# Patient Record
Sex: FEMALE | HISPANIC OR LATINO | Employment: UNEMPLOYED | URBAN - METROPOLITAN AREA
[De-identification: names, ages, dates, MRNs, and addresses within clinical notes are randomized per-mention and may not be internally consistent; named-entity substitution may affect disease eponyms.]

---

## 2024-04-30 ENCOUNTER — HOSPITAL ENCOUNTER (OUTPATIENT)
Facility: MEDICAL CENTER | Age: 24
End: 2024-04-30
Payer: COMMERCIAL

## 2024-04-30 ENCOUNTER — APPOINTMENT (OUTPATIENT)
Dept: OBGYN | Facility: CLINIC | Age: 24
End: 2024-04-30

## 2024-04-30 ENCOUNTER — INITIAL PRENATAL (OUTPATIENT)
Dept: OBGYN | Facility: CLINIC | Age: 24
End: 2024-04-30

## 2024-04-30 ENCOUNTER — APPOINTMENT (OUTPATIENT)
Dept: RADIOLOGY | Facility: IMAGING CENTER | Age: 24
End: 2024-04-30

## 2024-04-30 VITALS — SYSTOLIC BLOOD PRESSURE: 100 MMHG | WEIGHT: 101 LBS | DIASTOLIC BLOOD PRESSURE: 60 MMHG

## 2024-04-30 DIAGNOSIS — N91.2 AMENORRHEA: ICD-10-CM

## 2024-04-30 DIAGNOSIS — Z34.01 ENCOUNTER FOR SUPERVISION OF NORMAL FIRST PREGNANCY IN FIRST TRIMESTER: ICD-10-CM

## 2024-04-30 PROCEDURE — 76801 OB US < 14 WKS SINGLE FETUS: CPT | Mod: TC | Performed by: NURSE PRACTITIONER

## 2024-04-30 PROCEDURE — 76817 TRANSVAGINAL US OBSTETRIC: CPT | Mod: TC | Performed by: NURSE PRACTITIONER

## 2024-04-30 ASSESSMENT — EDINBURGH POSTNATAL DEPRESSION SCALE (EPDS)
I HAVE BEEN SO UNHAPPY THAT I HAVE HAD DIFFICULTY SLEEPING: NOT AT ALL
I HAVE BEEN ABLE TO LAUGH AND SEE THE FUNNY SIDE OF THINGS: AS MUCH AS I ALWAYS COULD
TOTAL SCORE: 4
I HAVE BLAMED MYSELF UNNECESSARILY WHEN THINGS WENT WRONG: NO, NEVER
THINGS HAVE BEEN GETTING ON TOP OF ME: NO, MOST OF THE TIME I HAVE COPED QUITE WELL
I HAVE BEEN SO UNHAPPY THAT I HAVE BEEN CRYING: NO, NEVER
I HAVE FELT SAD OR MISERABLE: NO, NOT AT ALL
I HAVE BEEN ANXIOUS OR WORRIED FOR NO GOOD REASON: HARDLY EVER
I HAVE LOOKED FORWARD WITH ENJOYMENT TO THINGS: RATHER LESS THAN I USED TO
I HAVE FELT SCARED OR PANICKY FOR NO GOOD REASON: NO, NOT MUCH
THE THOUGHT OF HARMING MYSELF HAS OCCURRED TO ME: NEVER

## 2024-04-30 NOTE — PROGRESS NOTES
Risk factors:   none  Referrals made today:   none      Subjective:   Monserrat Arceo is a 23 y.o.  who presents for her new OB exam. She is 10w0d with an FAY of Estimated Date of Delivery: 24 based off of US. This was unplanned and desired. She feels overall well. Her partner is supportive.     She has no concerns at this time. She lives with her partner and his family, currently staying at home. She is from [facility]. Denies ER visits or previous care in this pregnancy.     Denies any current or hx of sexual, emotional or physical abuse or trauma.     Last pap in [facility] in --WNL.    ROS:  denies weakness, fatigue, or malaise  denies headache, changes in vision  denies constipation, diarrhea  denies dysuria  denies changes in appetite, nausea, vomiting  denies vaginal bleeding, leaking of fluid, discharge, irritation  denies depression, anxiety  denies cramping/contractions  denies fetal movement    Not on File     History reviewed. No pertinent past medical history.  History of Varicella: denies  History of HSV I or II in self or partner: denies  History of Thyroid problems: denies    History reviewed. No pertinent family history.  denies hx family genetic conditions    Social History     Socioeconomic History    Marital status:      Spouse name: Not on file    Number of children: Not on file    Years of education: Not on file    Highest education level: Not on file   Occupational History    Not on file   Tobacco Use    Smoking status: Never    Smokeless tobacco: Never   Vaping Use    Vaping Use: Never used   Substance and Sexual Activity    Alcohol use: Never    Drug use: Never    Sexual activity: Yes     Partners: Male     Birth control/protection: None   Other Topics Concern    Not on file   Social History Narrative    Not on file     Social Determinants of Health     Financial Resource Strain: Not on file   Food Insecurity: Not on file   Transportation Needs: Not on file    Physical Activity: Not on file   Stress: Not on file   Social Connections: Not on file   Intimate Partner Violence: Not on file   Housing Stability: Not on file     denies current smoking, alcohol use, illicit drug use    OB History    Para Term  AB Living   1             SAB IAB Ectopic Molar Multiple Live Births                    # Outcome Date GA Lbr Zana/2nd Weight Sex Delivery Anes PTL Lv   1 Current                Objective:  /60   Wt 101 lb      FHTs: 177    Well nourished female in no acute distress  AAO x 3  Heart RRR  Lungs clear to auscultation bilaterally  No edema noted, pulses WNL bilaterally in lower extremities  BS x 4; no guarding or tenderness    Assessment:        1.  IUP @ 10w0d per US        2.  S=D        3.  See problem list below     Patient Active Problem List    Diagnosis Date Noted    Encounter for supervision of normal first pregnancy in first trimester 2024     Plan:        1.  GC/CT        2.  Prenatal labs ordered - lab slip given        3.  Encouraged PNV; diet with high protein snacks and limited sugar/sugary beverages, adequate water intake; and foods/medications/exposures to avoid        4.  NOB packet given        5.  Discussed genetic testing options; desires NIPT today and AFP when appropriate        5.  Return to office in 4 wks        6.  Complete anatomy OB US in 10 wks      Orders Placed This Encounter    US-OB 2ND 3RD TRI COMPLETE    PREG CNTR PRENATAL PN    URINE DRUG SCREEN W/CONF (AR)    VAGINAL PATHOGENS DNA PANEL    URINE CULTURE    PANORAMA PRENATAL TEST    Chlamydia/GC, PCR (Genital/Anal swab)       Cristal Fall CNM

## 2024-04-30 NOTE — PROGRESS NOTES
Patient here for NEW OB   LMP= 2/10/24 aparox   FAY= 11/26/24  GA= 10w0d   Last pap- per pt got it done at [facility] 2022 wn ( will get it done at )   Phone number: [phone] (home)   Pharmacy verified  EPDS- 4

## 2024-05-01 LAB
C TRACH DNA GENITAL QL NAA+PROBE: NEGATIVE
CANDIDA DNA VAG QL PROBE+SIG AMP: NEGATIVE
G VAGINALIS DNA VAG QL PROBE+SIG AMP: NEGATIVE
N GONORRHOEA DNA GENITAL QL NAA+PROBE: NEGATIVE
SPECIMEN SOURCE: NORMAL
T VAGINALIS DNA VAG QL PROBE+SIG AMP: NEGATIVE

## 2024-05-03 LAB
BACTERIA UR CULT: NORMAL
SIGNIFICANT IND 70042: NORMAL
SITE SITE: NORMAL
SOURCE SOURCE: NORMAL

## 2024-05-28 ENCOUNTER — APPOINTMENT (OUTPATIENT)
Dept: OBGYN | Facility: CLINIC | Age: 24
End: 2024-05-28

## 2024-05-28 ENCOUNTER — ROUTINE PRENATAL (OUTPATIENT)
Dept: OBGYN | Facility: CLINIC | Age: 24
End: 2024-05-28

## 2024-05-28 VITALS — SYSTOLIC BLOOD PRESSURE: 98 MMHG | DIASTOLIC BLOOD PRESSURE: 52 MMHG | WEIGHT: 101.6 LBS

## 2024-05-28 DIAGNOSIS — Z34.01 ENCOUNTER FOR SUPERVISION OF NORMAL FIRST PREGNANCY IN FIRST TRIMESTER: ICD-10-CM

## 2024-05-28 PROCEDURE — 3074F SYST BP LT 130 MM HG: CPT | Performed by: MIDWIFE

## 2024-05-28 PROCEDURE — 0502F SUBSEQUENT PRENATAL CARE: CPT | Performed by: MIDWIFE

## 2024-05-28 PROCEDURE — 3078F DIAST BP <80 MM HG: CPT | Performed by: MIDWIFE

## 2024-05-28 NOTE — PROGRESS NOTES
Pt here today for OB follow up  Denies FM  WT: 101.6 lb  BP: 98/52  Preferred pharmacy verified with pt.  MFM Appointment: not at this time   Pt states no complaints or concerns today   Good # [phone]     07/09/2024    Prenatal labs not yet done. Pt states will get blood work done today    
S: Pt is a 24 y.o.  at 14w0d gestation here today for routine prenatal care.     Concerns today include:   Concerned about lack of weight gain. Taking in food and fluids - Reports eating mostly carbs and hydrating. Vomiting once a week.      Denies: vaginal bleeding, pelvic and abdominal pain, cramping, contractions, leaking of fluid, urinary and vaginal symptoms    Pt reports fetal movement as Decreased     O: BP 98/52   Wt 101 lb 9.6 oz   LMP 02/10/2024 (Approximate)    *see prenatal flowsheet*     Labs:     Prenatal labs: Needs to be completed.  GCT: not yet collected   GBS: Not yet collected  Genetic testing: none  STI testing: negative    Ultrasound: Anatomy scan scheduled     A/P:     Problem List Items Addressed This Visit          Women's Health Problems    Encounter for supervision of normal first pregnancy in first trimester     Pt is a 24 y.o.  at 14w0d gestation here today for routine prenatal care.   Size equal to dates    Discuss 2nd trimester warning signs  Address concerns: Discuss increasing protein intake. Come in if she is vomiting several times per week.   Plans for NIPT and NOB labs today  Anatomy scan scheduled.   RTC 4 wks              
No abnormalities noted

## 2024-05-28 NOTE — ASSESSMENT & PLAN NOTE
Pt is a 24 y.o.  at 14w0d gestation here today for routine prenatal care.   Size equal to dates    Discuss 2nd trimester warning signs  Address concerns: Discuss increasing protein intake. Come in if she is vomiting several times per week.   Plans for NIPT and NOB labs today  Anatomy scan scheduled.   RTC 4 wks

## 2024-06-11 ENCOUNTER — HOSPITAL ENCOUNTER (OUTPATIENT)
Dept: LAB | Facility: MEDICAL CENTER | Age: 24
End: 2024-06-11
Payer: COMMERCIAL

## 2024-06-11 DIAGNOSIS — Z34.01 ENCOUNTER FOR SUPERVISION OF NORMAL FIRST PREGNANCY IN FIRST TRIMESTER: ICD-10-CM

## 2024-06-11 LAB
ABO GROUP BLD: NORMAL
BLD GP AB SCN SERPL QL: NORMAL
ERYTHROCYTE [DISTWIDTH] IN BLOOD BY AUTOMATED COUNT: 41.4 FL (ref 35.9–50)
HBV SURFACE AG SER QL: ABNORMAL
HCT VFR BLD AUTO: 33.4 % (ref 37–47)
HCV AB SER QL: ABNORMAL
HGB BLD-MCNC: 11.7 G/DL (ref 12–16)
HIV 1+2 AB+HIV1 P24 AG SERPL QL IA: NORMAL
MCH RBC QN AUTO: 29.6 PG (ref 27–33)
MCHC RBC AUTO-ENTMCNC: 35 G/DL (ref 32.2–35.5)
MCV RBC AUTO: 84.6 FL (ref 81.4–97.8)
PLATELET # BLD AUTO: 228 K/UL (ref 164–446)
PMV BLD AUTO: 11 FL (ref 9–12.9)
RBC # BLD AUTO: 3.95 M/UL (ref 4.2–5.4)
RH BLD: NORMAL
RUBV AB SER QL: 74.6 IU/ML
T PALLIDUM AB SER QL IA: ABNORMAL
WBC # BLD AUTO: 8.9 K/UL (ref 4.8–10.8)

## 2024-06-13 LAB
AMPHET CTO UR CFM-MCNC: NEGATIVE NG/ML
BACTERIA UR CULT: NORMAL
BARBITURATES CTO UR CFM-MCNC: NEGATIVE NG/ML
BENZODIAZ CTO UR CFM-MCNC: NEGATIVE NG/ML
CANNABINOIDS CTO UR CFM-MCNC: NEGATIVE NG/ML
COCAINE CTO UR CFM-MCNC: NEGATIVE NG/ML
CREAT UR-MCNC: 30.7 MG/DL (ref 20–400)
DRUG COMMENT 753798: NORMAL
METHADONE CTO UR CFM-MCNC: NEGATIVE NG/ML
OPIATES CTO UR CFM-MCNC: NEGATIVE NG/ML
PCP CTO UR CFM-MCNC: NEGATIVE NG/ML
PROPOXYPH CTO UR CFM-MCNC: NEGATIVE NG/ML
SIGNIFICANT IND 70042: NORMAL
SITE SITE: NORMAL
SOURCE SOURCE: NORMAL

## 2024-06-26 ENCOUNTER — ROUTINE PRENATAL (OUTPATIENT)
Dept: OBGYN | Facility: CLINIC | Age: 24
End: 2024-06-26
Payer: MEDICAID

## 2024-06-26 VITALS — DIASTOLIC BLOOD PRESSURE: 59 MMHG | WEIGHT: 105 LBS | SYSTOLIC BLOOD PRESSURE: 101 MMHG

## 2024-06-26 DIAGNOSIS — Z34.02 ENCOUNTER FOR SUPERVISION OF NORMAL FIRST PREGNANCY IN SECOND TRIMESTER: ICD-10-CM

## 2024-06-26 PROCEDURE — 3078F DIAST BP <80 MM HG: CPT

## 2024-06-26 PROCEDURE — 3074F SYST BP LT 130 MM HG: CPT

## 2024-06-26 PROCEDURE — 0502F SUBSEQUENT PRENATAL CARE: CPT

## 2024-06-26 NOTE — PROGRESS NOTES
S: Pt is  at 18w1d here for routine OB follow up. She has no concerns today.  No ED or hospital visits since last seen. Reports she is feeling FM. Denies VB, LOF,  RUCs or vaginal DC.     O:  See above for vitals        PNLs WNL - reviewed with pt        NIPT - not completed    A: IUP 18w1d  Patient Active Problem List    Diagnosis Date Noted    Encounter for supervision of normal first pregnancy in first trimester 2024       P:  1.  Reviewed labs w pt.        2.  NIPT previously ordered, not sure yet if she wants to complete         3.  US is scheduled 24.        4.  Questions answered.        5.  Encouraged adequate water intake.        6.  F/u 4 wks.

## 2024-06-26 NOTE — PROGRESS NOTES
Pt here for a OB follow up   GA:  18w 1d  Pt states: she wants to go over lab results   + fetal movement.  No VB, LOF, UC's.  Phone # [phone]   Pharmacy Verified.   [id number]

## 2024-07-09 ENCOUNTER — APPOINTMENT (OUTPATIENT)
Dept: RADIOLOGY | Facility: IMAGING CENTER | Age: 24
End: 2024-07-09

## 2024-07-09 DIAGNOSIS — Z34.01 ENCOUNTER FOR SUPERVISION OF NORMAL FIRST PREGNANCY IN FIRST TRIMESTER: ICD-10-CM

## 2024-07-09 PROCEDURE — 76805 OB US >/= 14 WKS SNGL FETUS: CPT | Mod: TC

## 2024-07-10 ENCOUNTER — TELEPHONE (OUTPATIENT)
Dept: OBGYN | Facility: CLINIC | Age: 24
End: 2024-07-10
Payer: MEDICAID

## 2024-07-10 DIAGNOSIS — O28.3 ABNORMAL FETAL ULTRASOUND: ICD-10-CM

## 2024-07-10 DIAGNOSIS — Z34.02 ENCOUNTER FOR SUPERVISION OF NORMAL FIRST PREGNANCY IN SECOND TRIMESTER: ICD-10-CM

## 2024-07-25 ENCOUNTER — ROUTINE PRENATAL (OUTPATIENT)
Dept: OBGYN | Facility: CLINIC | Age: 24
End: 2024-07-25

## 2024-07-25 VITALS — WEIGHT: 113 LBS | SYSTOLIC BLOOD PRESSURE: 100 MMHG | DIASTOLIC BLOOD PRESSURE: 62 MMHG

## 2024-07-25 DIAGNOSIS — Z34.02 ENCOUNTER FOR SUPERVISION OF NORMAL FIRST PREGNANCY IN SECOND TRIMESTER: ICD-10-CM

## 2024-07-25 PROCEDURE — 3078F DIAST BP <80 MM HG: CPT

## 2024-07-25 PROCEDURE — 0502F SUBSEQUENT PRENATAL CARE: CPT

## 2024-07-25 PROCEDURE — 3074F SYST BP LT 130 MM HG: CPT

## 2024-08-13 ENCOUNTER — ANCILLARY PROCEDURE (OUTPATIENT)
Dept: MATERNAL FETAL MEDICINE | Facility: MEDICAL CENTER | Age: 24
End: 2024-08-13
Attending: OBSTETRICS & GYNECOLOGY
Payer: MEDICAID

## 2024-08-13 VITALS — HEART RATE: 81 BPM | DIASTOLIC BLOOD PRESSURE: 70 MMHG | WEIGHT: 118.6 LBS | SYSTOLIC BLOOD PRESSURE: 114 MMHG

## 2024-08-13 DIAGNOSIS — O28.3 ABNORMAL FETAL ULTRASOUND: ICD-10-CM

## 2024-08-13 DIAGNOSIS — O35.BXX0 FETAL CARDIAC ANOMALY COMPLICATING PREGNANCY, ANTEPARTUM, NOT APPLICABLE OR UNSPECIFIED FETUS: ICD-10-CM

## 2024-08-13 DIAGNOSIS — Z3A.25 25 WEEKS GESTATION OF PREGNANCY: ICD-10-CM

## 2024-08-13 DIAGNOSIS — O28.3 ECHOGENIC INTRACARDIAC FOCUS OF FETUS ON PRENATAL ULTRASOUND: ICD-10-CM

## 2024-08-13 PROCEDURE — 99203 OFFICE O/P NEW LOW 30 MIN: CPT | Performed by: OBSTETRICS & GYNECOLOGY

## 2024-08-13 PROCEDURE — 76811 OB US DETAILED SNGL FETUS: CPT | Performed by: OBSTETRICS & GYNECOLOGY

## 2024-08-22 ENCOUNTER — ROUTINE PRENATAL (OUTPATIENT)
Dept: OBGYN | Facility: CLINIC | Age: 24
End: 2024-08-22
Payer: MEDICAID

## 2024-08-22 VITALS — WEIGHT: 116 LBS | SYSTOLIC BLOOD PRESSURE: 106 MMHG | DIASTOLIC BLOOD PRESSURE: 64 MMHG

## 2024-08-22 DIAGNOSIS — Z34.02 ENCOUNTER FOR SUPERVISION OF NORMAL FIRST PREGNANCY IN SECOND TRIMESTER: ICD-10-CM

## 2024-08-22 DIAGNOSIS — O28.3 ABNORMAL FETAL ULTRASOUND: ICD-10-CM

## 2024-08-22 PROCEDURE — 0502F SUBSEQUENT PRENATAL CARE: CPT | Performed by: NURSE PRACTITIONER

## 2024-08-22 PROCEDURE — 3078F DIAST BP <80 MM HG: CPT | Performed by: NURSE PRACTITIONER

## 2024-08-22 PROCEDURE — 3074F SYST BP LT 130 MM HG: CPT | Performed by: NURSE PRACTITIONER

## 2024-08-22 NOTE — PROGRESS NOTES
SUBJECTIVE:  Pt is a 24 y.o.   at 26w2d  gestation. Presents today for follow-up prenatal care. Reports good  fetal movement. Denies regular cramping/contractions, bleeding or leaking of fluid. Denies dysuria, headaches, N/V. Generally feels well today.      OBJECTIVE:  - See prenatal vitals flow  -   Vitals:    24 1025   BP: 106/64   Weight: 116 lb                 ASSESSMENT:   - IUP at 26w2d    - S=D   -   Patient Active Problem List    Diagnosis Date Noted    Abnormal fetal ultrasound 07/10/2024    Encounter for supervision of normal first pregnancy in second trimester 2024         PLAN:  - S/sx pregnancy and labor warning signs vs general discomforts discussed  - Fetal movements and/or kick counts reviewed   - Adequate hydration reinforced  - Nutrition/exercise/vitamin education; continue PNV  - GCT ordered  - NIPT low risk and no further follow up with MFM will need  echo  - Anticipatory guidance given  - RTC in 4 weeks for follow-up prenatal care

## 2024-08-22 NOTE — PROGRESS NOTES
Pt. Here for OB/FU.   Reports Good FM.   Chaperone offered and indicated.   Pt states she would like to go over MFM US and genetic testing results.   Phone/Pharm verified.         ID : [id number]

## 2024-09-18 ENCOUNTER — HOSPITAL ENCOUNTER (OUTPATIENT)
Dept: LAB | Facility: MEDICAL CENTER | Age: 24
End: 2024-09-18
Attending: NURSE PRACTITIONER
Payer: COMMERCIAL

## 2024-09-18 DIAGNOSIS — Z34.02 ENCOUNTER FOR SUPERVISION OF NORMAL FIRST PREGNANCY IN SECOND TRIMESTER: ICD-10-CM

## 2024-09-18 LAB
ERYTHROCYTE [DISTWIDTH] IN BLOOD BY AUTOMATED COUNT: 43 FL (ref 35.9–50)
GLUCOSE 1H P 50 G GLC PO SERPL-MCNC: 153 MG/DL (ref 70–139)
HCT VFR BLD AUTO: 31.8 % (ref 37–47)
HGB BLD-MCNC: 10.2 G/DL (ref 12–16)
MCH RBC QN AUTO: 29.5 PG (ref 27–33)
MCHC RBC AUTO-ENTMCNC: 32.1 G/DL (ref 32.2–35.5)
MCV RBC AUTO: 91.9 FL (ref 81.4–97.8)
PLATELET # BLD AUTO: 242 K/UL (ref 164–446)
PMV BLD AUTO: 11.4 FL (ref 9–12.9)
RBC # BLD AUTO: 3.46 M/UL (ref 4.2–5.4)
T PALLIDUM AB SER QL IA: NORMAL
WBC # BLD AUTO: 9.6 K/UL (ref 4.8–10.8)

## 2024-09-19 PROBLEM — Z34.03 ENCOUNTER FOR SUPERVISION OF NORMAL FIRST PREGNANCY IN THIRD TRIMESTER: Status: ACTIVE | Noted: 2024-04-30

## 2024-09-19 PROBLEM — O99.013 ANEMIA DURING PREGNANCY IN THIRD TRIMESTER: Status: ACTIVE | Noted: 2024-09-19

## 2024-10-04 ENCOUNTER — ROUTINE PRENATAL (OUTPATIENT)
Dept: OBGYN | Facility: CLINIC | Age: 24
End: 2024-10-04

## 2024-10-04 VITALS — WEIGHT: 123 LBS | SYSTOLIC BLOOD PRESSURE: 100 MMHG | DIASTOLIC BLOOD PRESSURE: 60 MMHG

## 2024-10-04 DIAGNOSIS — Z34.03 ENCOUNTER FOR PRENATAL CARE IN THIRD TRIMESTER OF FIRST PREGNANCY: ICD-10-CM

## 2024-10-04 PROCEDURE — 0502F SUBSEQUENT PRENATAL CARE: CPT | Performed by: NURSE PRACTITIONER

## 2024-10-04 PROCEDURE — 90656 IIV3 VACC NO PRSV 0.5 ML IM: CPT | Performed by: NURSE PRACTITIONER

## 2024-10-04 PROCEDURE — 90472 IMMUNIZATION ADMIN EACH ADD: CPT | Performed by: NURSE PRACTITIONER

## 2024-10-04 PROCEDURE — 90471 IMMUNIZATION ADMIN: CPT | Performed by: NURSE PRACTITIONER

## 2024-10-04 PROCEDURE — 90715 TDAP VACCINE 7 YRS/> IM: CPT | Mod: JZ | Performed by: NURSE PRACTITIONER

## 2024-10-04 RX ORDER — FERROUS SULFATE 325(65) MG
325 TABLET, DELAYED RELEASE (ENTERIC COATED) ORAL 2 TIMES DAILY WITH MEALS
Qty: 90 TABLET | Refills: 3 | Status: SHIPPED | OUTPATIENT
Start: 2024-10-04

## 2024-10-18 ENCOUNTER — HOSPITAL ENCOUNTER (OUTPATIENT)
Dept: LAB | Facility: MEDICAL CENTER | Age: 24
End: 2024-10-18
Attending: NURSE PRACTITIONER
Payer: COMMERCIAL

## 2024-10-18 DIAGNOSIS — Z34.03 ENCOUNTER FOR PRENATAL CARE IN THIRD TRIMESTER OF FIRST PREGNANCY: ICD-10-CM

## 2024-10-18 LAB
GLUCOSE 1H P CHAL SERPL-MCNC: 191 MG/DL (ref 65–180)
GLUCOSE 2H P CHAL SERPL-MCNC: 116 MG/DL (ref 65–155)
GLUCOSE 3H P CHAL SERPL-MCNC: 100 MG/DL (ref 65–140)
GLUCOSE BS SERPL-MCNC: 75 MG/DL (ref 65–95)

## 2024-10-21 ENCOUNTER — APPOINTMENT (OUTPATIENT)
Dept: OBGYN | Facility: CLINIC | Age: 24
End: 2024-10-21
Payer: MEDICAID

## 2024-10-24 ENCOUNTER — ROUTINE PRENATAL (OUTPATIENT)
Dept: OBGYN | Facility: CLINIC | Age: 24
End: 2024-10-24
Payer: MEDICAID

## 2024-10-24 VITALS — SYSTOLIC BLOOD PRESSURE: 90 MMHG | WEIGHT: 126 LBS | DIASTOLIC BLOOD PRESSURE: 60 MMHG

## 2024-10-24 DIAGNOSIS — Z34.03 ENCOUNTER FOR SUPERVISION OF NORMAL FIRST PREGNANCY IN THIRD TRIMESTER: ICD-10-CM

## 2024-10-24 PROCEDURE — 0502F SUBSEQUENT PRENATAL CARE: CPT

## 2024-10-24 PROCEDURE — 90678 RSV VACC PREF BIVALENT IM: CPT

## 2024-10-24 PROCEDURE — 96381 ADMN RSV MONOC ANTB IM NJX: CPT

## 2024-10-28 ENCOUNTER — TELEPHONE (OUTPATIENT)
Dept: OBGYN | Facility: CLINIC | Age: 24
End: 2024-10-28
Payer: MEDICAID

## 2024-10-30 ENCOUNTER — APPOINTMENT (OUTPATIENT)
Dept: OBGYN | Facility: CLINIC | Age: 24
End: 2024-10-30
Payer: MEDICAID

## 2024-11-07 ENCOUNTER — ROUTINE PRENATAL (OUTPATIENT)
Dept: OBGYN | Facility: CLINIC | Age: 24
End: 2024-11-07
Payer: MEDICAID

## 2024-11-07 ENCOUNTER — HOSPITAL ENCOUNTER (OUTPATIENT)
Facility: MEDICAL CENTER | Age: 24
End: 2024-11-07
Attending: NURSE PRACTITIONER

## 2024-11-07 VITALS — SYSTOLIC BLOOD PRESSURE: 92 MMHG | DIASTOLIC BLOOD PRESSURE: 60 MMHG | WEIGHT: 127 LBS

## 2024-11-07 DIAGNOSIS — Z34.03 ENCOUNTER FOR SUPERVISION OF NORMAL FIRST PREGNANCY IN THIRD TRIMESTER: ICD-10-CM

## 2024-11-07 PROCEDURE — 3074F SYST BP LT 130 MM HG: CPT | Performed by: NURSE PRACTITIONER

## 2024-11-07 PROCEDURE — 87081 CULTURE SCREEN ONLY: CPT

## 2024-11-07 PROCEDURE — 0502F SUBSEQUENT PRENATAL CARE: CPT | Performed by: NURSE PRACTITIONER

## 2024-11-07 PROCEDURE — 3078F DIAST BP <80 MM HG: CPT | Performed by: NURSE PRACTITIONER

## 2024-11-07 PROCEDURE — 87150 DNA/RNA AMPLIFIED PROBE: CPT

## 2024-11-07 NOTE — PROGRESS NOTES
Pt. Here for OB/FU.   Reports Good FM.   Chaperone offered and indicated.   GBS today.   Pt states she has had some cramping recently.   Phone/Pharm verified.     ID : [id number]

## 2024-11-07 NOTE — PROGRESS NOTES
SUBJECTIVE:  Pt is a 24 y.o.   at 37w2d  gestation. Presents today for follow-up prenatal care. Reports good  fetal movement. Denies regular cramping/contractions, bleeding or leaking of fluid. Denies dysuria, headaches, N/V. Generally feels well today.     OBJECTIVE:  - See prenatal vitals flow  -   Vitals:    24 1112   BP: 92/60   Weight: 127 lb                 ASSESSMENT:   - IUP at 37w2d    - S=D   -   Patient Active Problem List    Diagnosis Date Noted    Anemia during pregnancy in third trimester 2024    Abnormal fetal ultrasound 07/10/2024    Encounter for supervision of normal first pregnancy in third trimester 2024         PLAN:  - S/sx pregnancy and labor warning signs vs general discomforts discussed  - Fetal movements and/or kick counts reviewed   - Adequate hydration reinforced  - Nutrition/exercise/vitamin education; continue PNV  - GBS collected   - Anticipatory guidance given  - RTC in 1 weeks for follow-up prenatal care

## 2024-11-09 LAB — GP B STREP DNA SPEC QL NAA+PROBE: NEGATIVE

## 2024-11-14 ENCOUNTER — ROUTINE PRENATAL (OUTPATIENT)
Dept: OBGYN | Facility: CLINIC | Age: 24
End: 2024-11-14
Payer: MEDICAID

## 2024-11-14 VITALS — DIASTOLIC BLOOD PRESSURE: 70 MMHG | SYSTOLIC BLOOD PRESSURE: 108 MMHG | WEIGHT: 131.2 LBS

## 2024-11-14 DIAGNOSIS — O28.3 ABNORMAL FETAL ULTRASOUND: ICD-10-CM

## 2024-11-14 DIAGNOSIS — Z34.03 ENCOUNTER FOR SUPERVISION OF NORMAL FIRST PREGNANCY IN THIRD TRIMESTER: ICD-10-CM

## 2024-11-14 PROCEDURE — 0502F SUBSEQUENT PRENATAL CARE: CPT | Performed by: NURSE PRACTITIONER

## 2024-11-14 PROCEDURE — 3078F DIAST BP <80 MM HG: CPT | Performed by: NURSE PRACTITIONER

## 2024-11-14 PROCEDURE — 3074F SYST BP LT 130 MM HG: CPT | Performed by: NURSE PRACTITIONER

## 2024-11-14 RX ORDER — ONDANSETRON 4 MG/1
4 TABLET, ORALLY DISINTEGRATING ORAL EVERY 6 HOURS PRN
Qty: 30 TABLET | Refills: 0 | Status: ON HOLD | OUTPATIENT
Start: 2024-11-14 | End: 2024-11-25

## 2024-11-14 NOTE — PROGRESS NOTES
SUBJECTIVE:  Pt is a 24 y.o.   at 38w2d  gestation. Presents today for follow-up prenatal care. Reports good  fetal movement. Denies regular cramping/contractions, bleeding or leaking of fluid. Denies dysuria, headaches, N/V. Generally feels well today except devika quiroz.     OBJECTIVE:  - See prenatal vitals flow  -   Vitals:    24 1119   BP: 108/70   Weight: 131 lb 3.2 oz                 ASSESSMENT:   - IUP at 38w2d    - S=D   -   Patient Active Problem List    Diagnosis Date Noted    Anemia during pregnancy in third trimester 2024    Abnormal fetal ultrasound 07/10/2024    Encounter for supervision of normal first pregnancy in third trimester 2024         PLAN:  - S/sx pregnancy and labor warning signs vs general discomforts discussed  - Fetal movements and/or kick counts reviewed   - Adequate hydration reinforced  - Nutrition/exercise/vitamin education; continue PNV  - Unsure about IOL timing, will check in next week  - Anticipatory guidance given  - RTC in 1 weeks for follow-up prenatal care

## 2024-11-14 NOTE — PROGRESS NOTES
Pt here today for OBFV   +FM movemnet  No VB, LOF. 's   Phone number [phone] (home)    Pharmacy verified

## 2024-11-21 ENCOUNTER — APPOINTMENT (OUTPATIENT)
Dept: OBGYN | Facility: CLINIC | Age: 24
End: 2024-11-21
Payer: MEDICAID

## 2024-11-21 NOTE — PROGRESS NOTES
S: Pt is a 24 y.o.  at 39w1d gestation here today for routine prenatal care.     Concerns today include:  {AHReports:92122}    Denies: {AHDENIESROB:18423}    Pt reports fetal movement as {fetalmvmt:83239}     O: LMP 02/10/2024 (Approximate)    *see prenatal flowsheet*     Labs:     Prenatal labs: Completed and normal  GCT: 1h 153, 3h with one elevation    GBS: Negative.  Genetic testing: N/A   STI testing: negative    Ultrasound: none since last visit     A/P:     Problem List Items Addressed This Visit    None

## 2024-11-22 ENCOUNTER — ROUTINE PRENATAL (OUTPATIENT)
Dept: OBGYN | Facility: CLINIC | Age: 24
End: 2024-11-22
Payer: MEDICAID

## 2024-11-22 VITALS — SYSTOLIC BLOOD PRESSURE: 108 MMHG | WEIGHT: 133.8 LBS | DIASTOLIC BLOOD PRESSURE: 70 MMHG

## 2024-11-22 DIAGNOSIS — Z34.03 ENCOUNTER FOR SUPERVISION OF NORMAL FIRST PREGNANCY IN THIRD TRIMESTER: ICD-10-CM

## 2024-11-22 PROCEDURE — 3078F DIAST BP <80 MM HG: CPT | Performed by: NURSE PRACTITIONER

## 2024-11-22 PROCEDURE — 0502F SUBSEQUENT PRENATAL CARE: CPT | Performed by: NURSE PRACTITIONER

## 2024-11-22 PROCEDURE — 3074F SYST BP LT 130 MM HG: CPT | Performed by: NURSE PRACTITIONER

## 2024-11-22 NOTE — PROGRESS NOTES
Pt. Here for OB/FU.   Reports Good FM.     Chaperone offered and indicated.   Pt desires a cervical check.   Pt states in the morning times her stomach will get hard sometimes.  Phone/Pharm verified.   [phone]      ID : [id number]

## 2024-11-22 NOTE — PROGRESS NOTES
SUBJECTIVE:  Pt is a 24 y.o.   at 39w3d  gestation. Presents today for follow-up prenatal care. Reports good  fetal movement. Denies regular cramping/contractions, bleeding or leaking of fluid. Denies dysuria, headaches, N/V. Generally feels well today except devika-quiroz on and off.      OBJECTIVE:  - See prenatal vitals flow  -   Vitals:    24 1131   BP: 108/70   Weight: 133 lb 12.8 oz                 ASSESSMENT:   - IUP at 39w3d    - S=D   -   Patient Active Problem List    Diagnosis Date Noted    Anemia during pregnancy in third trimester 2024    Abnormal fetal ultrasound 07/10/2024    Encounter for supervision of normal first pregnancy in third trimester 2024         PLAN:  - S/sx pregnancy and labor warning signs vs general discomforts discussed  - Fetal movements and/or kick counts reviewed   - Adequate hydration reinforced  - Nutrition/exercise/vitamin education; continue PNV  - Desires IOL around   - Will continue home exercises for encouraging labor  - Membranes swept today   - Anticipatory guidance given  - RTC PRN

## 2024-11-25 ENCOUNTER — HOSPITAL ENCOUNTER (INPATIENT)
Facility: MEDICAL CENTER | Age: 24
LOS: 2 days | End: 2024-11-27
Attending: OBSTETRICS & GYNECOLOGY | Admitting: OBSTETRICS & GYNECOLOGY
Payer: MEDICAID

## 2024-11-25 ENCOUNTER — ANESTHESIA EVENT (OUTPATIENT)
Dept: ANESTHESIOLOGY | Facility: MEDICAL CENTER | Age: 24
End: 2024-11-25
Payer: MEDICAID

## 2024-11-25 ENCOUNTER — ANESTHESIA (OUTPATIENT)
Dept: ANESTHESIOLOGY | Facility: MEDICAL CENTER | Age: 24
End: 2024-11-25
Payer: MEDICAID

## 2024-11-25 LAB
BASOPHILS # BLD AUTO: 0.3 % (ref 0–1.8)
BASOPHILS # BLD: 0.03 K/UL (ref 0–0.12)
EOSINOPHIL # BLD AUTO: 0.01 K/UL (ref 0–0.51)
EOSINOPHIL NFR BLD: 0.1 % (ref 0–6.9)
ERYTHROCYTE [DISTWIDTH] IN BLOOD BY AUTOMATED COUNT: 41.1 FL (ref 35.9–50)
HCT VFR BLD AUTO: 34.9 % (ref 37–47)
HGB BLD-MCNC: 11.2 G/DL (ref 12–16)
HOLDING TUBE BB 8507: NORMAL
IMM GRANULOCYTES # BLD AUTO: 0.11 K/UL (ref 0–0.11)
IMM GRANULOCYTES NFR BLD AUTO: 1.1 % (ref 0–0.9)
LYMPHOCYTES # BLD AUTO: 1.49 K/UL (ref 1–4.8)
LYMPHOCYTES NFR BLD: 15 % (ref 22–41)
MCH RBC QN AUTO: 26.2 PG (ref 27–33)
MCHC RBC AUTO-ENTMCNC: 32.1 G/DL (ref 32.2–35.5)
MCV RBC AUTO: 81.7 FL (ref 81.4–97.8)
MONOCYTES # BLD AUTO: 0.94 K/UL (ref 0–0.85)
MONOCYTES NFR BLD AUTO: 9.4 % (ref 0–13.4)
NEUTROPHILS # BLD AUTO: 7.38 K/UL (ref 1.82–7.42)
NEUTROPHILS NFR BLD: 74.1 % (ref 44–72)
NRBC # BLD AUTO: 0 K/UL
NRBC BLD-RTO: 0 /100 WBC (ref 0–0.2)
PLATELET # BLD AUTO: 248 K/UL (ref 164–446)
PMV BLD AUTO: 11.9 FL (ref 9–12.9)
RBC # BLD AUTO: 4.27 M/UL (ref 4.2–5.4)
T PALLIDUM AB SER QL IA: NORMAL
WBC # BLD AUTO: 10 K/UL (ref 4.8–10.8)

## 2024-11-25 PROCEDURE — 36415 COLL VENOUS BLD VENIPUNCTURE: CPT

## 2024-11-25 PROCEDURE — 700111 HCHG RX REV CODE 636 W/ 250 OVERRIDE (IP): Performed by: ANESTHESIOLOGY

## 2024-11-25 PROCEDURE — 86780 TREPONEMA PALLIDUM: CPT

## 2024-11-25 PROCEDURE — 303615 HCHG EPIDURAL/SPINAL ANESTHESIA FOR LABOR

## 2024-11-25 PROCEDURE — 304965 HCHG RECOVERY SERVICES

## 2024-11-25 PROCEDURE — 59409 OBSTETRICAL CARE: CPT | Performed by: STUDENT IN AN ORGANIZED HEALTH CARE EDUCATION/TRAINING PROGRAM

## 2024-11-25 PROCEDURE — 700105 HCHG RX REV CODE 258: Performed by: STUDENT IN AN ORGANIZED HEALTH CARE EDUCATION/TRAINING PROGRAM

## 2024-11-25 PROCEDURE — 59409 OBSTETRICAL CARE: CPT

## 2024-11-25 PROCEDURE — 700111 HCHG RX REV CODE 636 W/ 250 OVERRIDE (IP): Mod: JZ | Performed by: OBSTETRICS & GYNECOLOGY

## 2024-11-25 PROCEDURE — 770002 HCHG ROOM/CARE - OB PRIVATE (112)

## 2024-11-25 PROCEDURE — 700105 HCHG RX REV CODE 258: Performed by: OBSTETRICS & GYNECOLOGY

## 2024-11-25 PROCEDURE — 85025 COMPLETE CBC W/AUTO DIFF WBC: CPT

## 2024-11-25 PROCEDURE — 700111 HCHG RX REV CODE 636 W/ 250 OVERRIDE (IP): Performed by: STUDENT IN AN ORGANIZED HEALTH CARE EDUCATION/TRAINING PROGRAM

## 2024-11-25 RX ORDER — BUPIVACAINE HYDROCHLORIDE 2.5 MG/ML
INJECTION, SOLUTION EPIDURAL; INFILTRATION; INTRACAUDAL PRN
Status: DISCONTINUED | OUTPATIENT
Start: 2024-11-25 | End: 2024-11-25 | Stop reason: SURG

## 2024-11-25 RX ORDER — ONDANSETRON 2 MG/ML
4 INJECTION INTRAMUSCULAR; INTRAVENOUS EVERY 6 HOURS PRN
Status: DISCONTINUED | OUTPATIENT
Start: 2024-11-25 | End: 2024-11-25 | Stop reason: HOSPADM

## 2024-11-25 RX ORDER — ROPIVACAINE HYDROCHLORIDE 2 MG/ML
INJECTION, SOLUTION EPIDURAL; INFILTRATION; PERINEURAL CONTINUOUS
Status: DISCONTINUED | OUTPATIENT
Start: 2024-11-25 | End: 2024-11-27 | Stop reason: HOSPADM

## 2024-11-25 RX ORDER — TERBUTALINE SULFATE 1 MG/ML
0.25 INJECTION, SOLUTION SUBCUTANEOUS
Status: DISCONTINUED | OUTPATIENT
Start: 2024-11-25 | End: 2024-11-25 | Stop reason: HOSPADM

## 2024-11-25 RX ORDER — SODIUM CHLORIDE, SODIUM LACTATE, POTASSIUM CHLORIDE, AND CALCIUM CHLORIDE .6; .31; .03; .02 G/100ML; G/100ML; G/100ML; G/100ML
1000 INJECTION, SOLUTION INTRAVENOUS
Status: DISCONTINUED | OUTPATIENT
Start: 2024-11-25 | End: 2024-11-25 | Stop reason: HOSPADM

## 2024-11-25 RX ORDER — OXYTOCIN 10 [USP'U]/ML
10 INJECTION, SOLUTION INTRAMUSCULAR; INTRAVENOUS
Status: DISCONTINUED | OUTPATIENT
Start: 2024-11-25 | End: 2024-11-25 | Stop reason: HOSPADM

## 2024-11-25 RX ORDER — DOCUSATE SODIUM 100 MG/1
100 CAPSULE, LIQUID FILLED ORAL 2 TIMES DAILY PRN
Status: DISCONTINUED | OUTPATIENT
Start: 2024-11-25 | End: 2024-11-27 | Stop reason: HOSPADM

## 2024-11-25 RX ORDER — EPHEDRINE SULFATE 50 MG/ML
5 INJECTION, SOLUTION INTRAVENOUS
Status: DISCONTINUED | OUTPATIENT
Start: 2024-11-25 | End: 2024-11-25 | Stop reason: HOSPADM

## 2024-11-25 RX ORDER — ONDANSETRON 4 MG/1
4 TABLET, ORALLY DISINTEGRATING ORAL EVERY 6 HOURS PRN
Status: DISCONTINUED | OUTPATIENT
Start: 2024-11-25 | End: 2024-11-25 | Stop reason: HOSPADM

## 2024-11-25 RX ORDER — SODIUM CHLORIDE, SODIUM LACTATE, POTASSIUM CHLORIDE, AND CALCIUM CHLORIDE .6; .31; .03; .02 G/100ML; G/100ML; G/100ML; G/100ML
250 INJECTION, SOLUTION INTRAVENOUS PRN
Status: DISCONTINUED | OUTPATIENT
Start: 2024-11-25 | End: 2024-11-25 | Stop reason: HOSPADM

## 2024-11-25 RX ORDER — LIDOCAINE HYDROCHLORIDE 10 MG/ML
20 INJECTION, SOLUTION INFILTRATION; PERINEURAL
Status: DISCONTINUED | OUTPATIENT
Start: 2024-11-25 | End: 2024-11-25 | Stop reason: HOSPADM

## 2024-11-25 RX ORDER — IBUPROFEN 800 MG/1
800 TABLET, FILM COATED ORAL EVERY 8 HOURS PRN
Status: DISCONTINUED | OUTPATIENT
Start: 2024-11-25 | End: 2024-11-27 | Stop reason: HOSPADM

## 2024-11-25 RX ORDER — ACETAMINOPHEN 500 MG
1000 TABLET ORAL
Status: DISCONTINUED | OUTPATIENT
Start: 2024-11-25 | End: 2024-11-25 | Stop reason: HOSPADM

## 2024-11-25 RX ORDER — SODIUM CHLORIDE, SODIUM LACTATE, POTASSIUM CHLORIDE, CALCIUM CHLORIDE 600; 310; 30; 20 MG/100ML; MG/100ML; MG/100ML; MG/100ML
INJECTION, SOLUTION INTRAVENOUS CONTINUOUS
Status: DISCONTINUED | OUTPATIENT
Start: 2024-11-25 | End: 2024-11-27 | Stop reason: HOSPADM

## 2024-11-25 RX ORDER — IBUPROFEN 800 MG/1
800 TABLET, FILM COATED ORAL
Status: DISCONTINUED | OUTPATIENT
Start: 2024-11-25 | End: 2024-11-25 | Stop reason: HOSPADM

## 2024-11-25 RX ORDER — ACETAMINOPHEN 500 MG
1000 TABLET ORAL EVERY 6 HOURS PRN
Status: DISCONTINUED | OUTPATIENT
Start: 2024-11-25 | End: 2024-11-27 | Stop reason: HOSPADM

## 2024-11-25 RX ADMIN — OXYTOCIN 10 UNITS: 10 INJECTION, SOLUTION INTRAMUSCULAR; INTRAVENOUS at 20:45

## 2024-11-25 RX ADMIN — BUPIVACAINE HYDROCHLORIDE 4 ML: 2.5 INJECTION, SOLUTION EPIDURAL; INFILTRATION; INTRACAUDAL at 13:58

## 2024-11-25 RX ADMIN — FENTANYL CITRATE 100 MCG: 50 INJECTION, SOLUTION INTRAMUSCULAR; INTRAVENOUS at 08:25

## 2024-11-25 RX ADMIN — FENTANYL CITRATE 100 MCG: 50 INJECTION, SOLUTION INTRAMUSCULAR; INTRAVENOUS at 10:55

## 2024-11-25 RX ADMIN — OXYTOCIN 125 ML/HR: 10 INJECTION, SOLUTION INTRAMUSCULAR; INTRAVENOUS at 21:30

## 2024-11-25 RX ADMIN — OXYTOCIN 10 UNITS: 10 INJECTION, SOLUTION INTRAMUSCULAR; INTRAVENOUS at 21:00

## 2024-11-25 RX ADMIN — OXYTOCIN 1 MILLI-UNITS/MIN: 10 INJECTION, SOLUTION INTRAMUSCULAR; INTRAVENOUS at 11:15

## 2024-11-25 RX ADMIN — ROPIVACAINE HYDROCHLORIDE: 2 INJECTION, SOLUTION EPIDURAL; INFILTRATION; PERINEURAL at 14:11

## 2024-11-25 RX ADMIN — SODIUM CHLORIDE, POTASSIUM CHLORIDE, SODIUM LACTATE AND CALCIUM CHLORIDE: 600; 310; 30; 20 INJECTION, SOLUTION INTRAVENOUS at 11:12

## 2024-11-25 SDOH — ECONOMIC STABILITY: TRANSPORTATION INSECURITY
IN THE PAST 12 MONTHS, HAS THE LACK OF TRANSPORTATION KEPT YOU FROM MEDICAL APPOINTMENTS OR FROM GETTING MEDICATIONS?: NO

## 2024-11-25 SDOH — ECONOMIC STABILITY: TRANSPORTATION INSECURITY
IN THE PAST 12 MONTHS, HAS LACK OF RELIABLE TRANSPORTATION KEPT YOU FROM MEDICAL APPOINTMENTS, MEETINGS, WORK OR FROM GETTING THINGS NEEDED FOR DAILY LIVING?: NO

## 2024-11-25 ASSESSMENT — LIFESTYLE VARIABLES
HAVE YOU EVER FELT YOU SHOULD CUT DOWN ON YOUR DRINKING: NO
CONSUMPTION TOTAL: INCOMPLETE
ALCOHOL_USE: NO
EVER HAD A DRINK FIRST THING IN THE MORNING TO STEADY YOUR NERVES TO GET RID OF A HANGOVER: NO
DOES PATIENT WANT TO STOP DRINKING: NO
HAVE PEOPLE ANNOYED YOU BY CRITICIZING YOUR DRINKING: NO
TOTAL SCORE: 0
EVER FELT BAD OR GUILTY ABOUT YOUR DRINKING: NO

## 2024-11-25 ASSESSMENT — PAIN SCALES - GENERAL: PAIN_LEVEL: 2

## 2024-11-25 ASSESSMENT — SOCIAL DETERMINANTS OF HEALTH (SDOH)
WITHIN THE LAST YEAR, HAVE YOU BEEN HUMILIATED OR EMOTIONALLY ABUSED IN OTHER WAYS BY YOUR PARTNER OR EX-PARTNER?: NO
WITHIN THE LAST YEAR, HAVE YOU BEEN KICKED, HIT, SLAPPED, OR OTHERWISE PHYSICALLY HURT BY YOUR PARTNER OR EX-PARTNER?: NO
WITHIN THE LAST YEAR, HAVE YOU BEEN AFRAID OF YOUR PARTNER OR EX-PARTNER?: NO
IN THE PAST 12 MONTHS, HAS THE ELECTRIC, GAS, OIL, OR WATER COMPANY THREATENED TO SHUT OFF SERVICE IN YOUR HOME?: NO
WITHIN THE PAST 12 MONTHS, THE FOOD YOU BOUGHT JUST DIDN'T LAST AND YOU DIDN'T HAVE MONEY TO GET MORE: NEVER TRUE
WITHIN THE PAST 12 MONTHS, YOU WORRIED THAT YOUR FOOD WOULD RUN OUT BEFORE YOU GOT THE MONEY TO BUY MORE: NEVER TRUE
WITHIN THE LAST YEAR, HAVE TO BEEN RAPED OR FORCED TO HAVE ANY KIND OF SEXUAL ACTIVITY BY YOUR PARTNER OR EX-PARTNER?: NO

## 2024-11-25 ASSESSMENT — PATIENT HEALTH QUESTIONNAIRE - PHQ9
2. FEELING DOWN, DEPRESSED, IRRITABLE, OR HOPELESS: NOT AT ALL
SUM OF ALL RESPONSES TO PHQ9 QUESTIONS 1 AND 2: 0
1. LITTLE INTEREST OR PLEASURE IN DOING THINGS: NOT AT ALL

## 2024-11-25 ASSESSMENT — PAIN DESCRIPTION - PAIN TYPE
TYPE: ACUTE PAIN

## 2024-11-25 NOTE — H&P
Admission History and Physical      Monserrat Arceo is a 24 y.o. female  at 39w6d who presents for labor contractions. Denies LOF and vaginal bleeding. Pregnancy has been complicated by anemia and abnormal fetal ultrasound of EIF and small muscular VSD requiring  echo.     She desires comfort measures for pain management.     ROS: Pertinent items are noted in HPI.      No past medical history on file.  No past surgical history on file.  OB History    Para Term  AB Living   1             SAB IAB Ectopic Molar Multiple Live Births                    # Outcome Date GA Lbr Zana/2nd Weight Sex Type Anes PTL Lv   1 Current              Social History     Socioeconomic History    Marital status:      Spouse name: Not on file    Number of children: Not on file    Years of education: Not on file    Highest education level: Not on file   Occupational History    Not on file   Tobacco Use    Smoking status: Never    Smokeless tobacco: Never   Vaping Use    Vaping status: Never Used   Substance and Sexual Activity    Alcohol use: Never    Drug use: Never    Sexual activity: Yes     Partners: Male     Birth control/protection: None   Other Topics Concern    Not on file   Social History Narrative    Not on file     Social Drivers of Health     Financial Resource Strain: Not on file   Food Insecurity: Not on file   Transportation Needs: Not on file   Physical Activity: Not on file   Stress: Not on file   Social Connections: Not on file   Intimate Partner Violence: Not on file   Housing Stability: Not on file     Allergies: Patient has no known allergies.    Current Facility-Administered Medications:     lidocaine (XYLOCAINE) 1%  injection, 20 mL, Subcutaneous, Once PRN, Shon Renae M.D.    terbutaline (Brethine) injection 0.25 mg, 0.25 mg, Subcutaneous, Once PRN, Shon Renae M.D.    oxytocin (Pitocin) injection 10 Units, 10 Units, Intramuscular, Once PRN, Shon Renae M.D.     "oxytocin (Pitocin) infusion bolus (for post delivery), 1-10 Units, Intravenous, Once **FOLLOWED BY** oxytocin (Pitocin) infusion bolus (for post delivery), 10 Units, Intravenous, Once **FOLLOWED BY** oxytocin (Pitocin) infusion (for post delivery), 125 mL/hr, Intravenous, Continuous, Shon Renae M.D.    ibuprofen (Motrin) tablet 800 mg, 800 mg, Oral, Once PRN, Shon Renae M.D.    acetaminophen (Tylenol) tablet 1,000 mg, 1,000 mg, Oral, Once PRN, Shon Renae M.D.    LR infusion, , Intravenous, Continuous, Shon Renae M.D.    fentaNYL (Sublimaze) injection 50 mcg, 50 mcg, Intravenous, Q HOUR PRN **OR** fentaNYL (Sublimaze) injection 100 mcg, 100 mcg, Intravenous, Q HOUR PRN, Shon Renae M.D.    ondansetron (Zofran ODT) dispertab 4 mg, 4 mg, Oral, Q6HRS PRN **OR** ondansetron (Zofran) syringe/vial injection 4 mg, 4 mg, Intravenous, Q6HRS PRN, Shon Renae M.D.    Prenatal care with [facility] starting at 10.0 wks with following problems:  Patient Active Problem List    Diagnosis Date Noted    Labor and delivery indication for care or intervention 11/25/2024    Anemia during pregnancy in third trimester 09/19/2024    Abnormal fetal ultrasound 07/10/2024    Encounter for supervision of normal first pregnancy in third trimester 04/30/2024       Last US at 25.0 weeks    Fetal growth normal. EIF and FOA, small muscular VSD but anatomy otherwise normal  No genetic testing  Anterior placenta no previa      Objective:      /70   Pulse (!) 101   Temp (!) 35.3 °C (95.5 °F) (Temporal)   Resp 20   Ht 1.53 m (5' 0.24\")   Wt 60.3 kg (133 lb)   LMP 02/10/2024 (Approximate)   SpO2 97%   BMI 25.77 kg/m²     General:   no acute distress   Skin:   normal   HEENT:  PERRLA   Lungs:   CTA bilateral   Heart:   regular rate and rhythm, no pedal edema   Abdomen:   gravid, NT   EFW:  3200 g   Pelvis:  adequate   FHT:  120 BPM, moderate variability, +accels, no decels   Uterine Size: S=D   Presentations: Cephalic   Cervix:     " Dilation: 4    Effacement: 100%    Station:  0    Consistency: Soft    Position: Anterior     Lab Review  Lab:   Blood type: O     Recent Results (from the past 35 weeks)   VAGINAL PATHOGENS DNA PANEL    Collection Time: 04/30/24  3:46 PM   Result Value Ref Range    Candida species DNA Probe Negative Negative    Trichamonas vaginalis DNA Probe Negative Negative    Gardnerella vaginalis DNA Probe Negative Negative   Chlamydia/GC, PCR (Genital/Anal swab)    Collection Time: 04/30/24  3:46 PM    Specimen: Genital   Result Value Ref Range    C. trachomatis by PCR Negative Negative    N. gonorrhoeae by PCR Negative Negative    Source Genital    URINE CULTURE    Collection Time: 04/30/24  3:53 PM    Specimen: Urine, Clean Catch   Result Value Ref Range    Significant Indicator NEG     Source UR     Site URINE, CLEAN CATCH     Culture Result Usual urogenital nati >100,000 cfu/mL    URINE DRUG SCREEN W/CONF (AR)    Collection Time: 06/11/24 11:20 AM   Result Value Ref Range    Urine Amphetamine-Methamphetam Negative Cutoff 300 ng/mL    Barbiturates Negative Cutoff 200 ng/mL    Benzodiazepines Negative Cutoff 200 ng/mL    Propoxyphene Negative Cutoff 300 ng/mL    Cocaine Metabolite Negative Cutoff 150 ng/mL    Methadone Negative Cutoff 150 ng/mL    Codeine-Morphine Negative Cutoff 300 ng/mL    Phencyclidine -Pcp Negative Cutoff 25 ng/mL    Cannabinoid Metab Negative Cutoff 50 ng/mL    Creatinine Urine 30.7 20.0 - 400.0 mg/dL    Drug Comment Urine Drugs See Note    PREG CNTR PRENATAL PN    Collection Time: 06/11/24 11:20 AM   Result Value Ref Range    WBC 8.9 4.8 - 10.8 K/uL    RBC 3.95 (L) 4.20 - 5.40 M/uL    Hemoglobin 11.7 (L) 12.0 - 16.0 g/dL    Hematocrit 33.4 (L) 37.0 - 47.0 %    MCV 84.6 81.4 - 97.8 fL    MCH 29.6 27.0 - 33.0 pg    MCHC 35.0 32.2 - 35.5 g/dL    RDW 41.4 35.9 - 50.0 fL    Platelet Count 228 164 - 446 K/uL    MPV 11.0 9.0 - 12.9 fL    Hepatitis C Antibody Non-Reactive Non-Reactive    Hepatitis B Surface  Antigen Non-Reactive Non-Reactive    Rubella IgG Antibody 74.60 IU/mL    Syphilis, Treponemal Qual Non-Reactive Non-Reactive   URINE CULTURE(NEW)    Collection Time: 06/11/24 11:20 AM    Specimen: Urine   Result Value Ref Range    Significant Indicator NEG     Source UR     Site Clean Catch     Culture Result Usual urogenital nati >100,000 cfu/mL    HIV AG/AB COMBO ASSAY SCREENING    Collection Time: 06/11/24 11:20 AM   Result Value Ref Range    HIV Ag/Ab Combo Assay Non-Reactive Non Reactive   OP Prenatal Panel-Blood Bank    Collection Time: 06/11/24 11:20 AM   Result Value Ref Range    ABO Grouping Only O     Rh Grouping Only POS     Antibody Screen Scrn NEG    T.PALLIDUM AB YORDAN (SCREENING)    Collection Time: 09/18/24 10:26 AM   Result Value Ref Range    Syphilis, Treponemal Qual Non-Reactive Non-Reactive   CBC WITHOUT DIFFERENTIAL    Collection Time: 09/18/24 10:26 AM   Result Value Ref Range    WBC 9.6 4.8 - 10.8 K/uL    RBC 3.46 (L) 4.20 - 5.40 M/uL    Hemoglobin 10.2 (L) 12.0 - 16.0 g/dL    Hematocrit 31.8 (L) 37.0 - 47.0 %    MCV 91.9 81.4 - 97.8 fL    MCH 29.5 27.0 - 33.0 pg    MCHC 32.1 (L) 32.2 - 35.5 g/dL    RDW 43.0 35.9 - 50.0 fL    Platelet Count 242 164 - 446 K/uL    MPV 11.4 9.0 - 12.9 fL   GLUCOSE 1HR GESTATIONAL    Collection Time: 09/18/24 10:26 AM   Result Value Ref Range    Glucose, Post Dose 153 (H) 70 - 139 mg/dL   GLUCOSE 3 HR GESTATIONAL    Collection Time: 10/18/24 11:00 AM   Result Value Ref Range    Baseline Glucose 75 65 - 95 mg/dL    Glucose 1 Hour 191 (H) 65 - 180 mg/dL    Glucose 2 Hour 116 65 - 155 mg/dL    Glucose 3 Hour 100 65 - 140 mg/dL   GRP B STREP, BY PCR (PAREDES BROTH)    Collection Time: 11/07/24 11:40 AM    Specimen: Genital   Result Value Ref Range    Strep Gp B DNA PCR Negative Negative            Assessment:   Monserrat Arceo at 39w6d  Labor status: Early latent labor.  Obstetrical history significant for   Patient Active Problem List    Diagnosis Date  Noted    Labor and delivery indication for care or intervention 2024    Anemia during pregnancy in third trimester 2024    Abnormal fetal ultrasound 07/10/2024    Encounter for supervision of normal first pregnancy in third trimester 2024   .      Plan:     1. Admit to L&D for Spontaneous labor  2. GBS negative.   3. Desires  Comfort measures for pain relief.   4. Plan at this time is expectant. Consider AROM and pitocin for augmentation if appropriate.  5. Anticipate        Ramos Collier CNM/ Dr. Renae Attending

## 2024-11-25 NOTE — PROGRESS NOTES
0600- Report received from Rafael SALGUERO. Pt transferred to [id number] in stable condition, monitors applied x2, orders received. Care assumed.     0655- Report given to Alyssa SALGUERO. POC discussed, all questions answered. Pt stable, care relinquished.

## 2024-11-25 NOTE — ED PROVIDER NOTES
Progress Note; incorrectly entered as ED Provider note    Labor Progress Note:      S:  Pt reports she is having pain with contractions    Vitals:    24 0440 24 0645 24 0710 24 0930   BP:  116/62 110/70    Pulse: 97 94 (!) 101    Resp:  18 18    Temp:  36.2 °C (97.1 °F) 36 °C (96.8 °F) 36.2 °C (97.1 °F)   TempSrc:  Temporal Temporal Temporal   SpO2: 97% 99% 97%    Weight:       Height:           SVE: 4/100/0    FHT: 140/minimal to moderate variability/+accels/few likely variable decels but tracing interrupted, otherwise no decels  toco: ctx q2-3 minutes      A/P: 24 y.o.  @ 39w6d by 10 week U/S admitted for labor.  - labor: latent, not making cervical change. S/p AROM. Start pitocin now, recheck cervix in about 2 hours and place IUPC if no change at that time.   - FHT: cat 2 for periods of minimal variability c/w fetal sleep cycle and likely intermittent variable decels with interrupted tracing.  - GBS: neg  -  Rh +, RI    Madhuri Jackson M.D.

## 2024-11-25 NOTE — PROGRESS NOTES
Labor Progress Note:      S:  Pt reports she is more uncomfortable and contractions are getting stronger. She desires epidural.     Vitals:    24 0645 24 0710 24 0930 24 1115   BP: 116/62 110/70     Pulse: 94 (!) 101     Resp: 18 18     Temp: 36.2 °C (97.1 °F) 36 °C (96.8 °F) 36.2 °C (97.1 °F) 36.3 °C (97.3 °F)   TempSrc: Temporal Temporal Temporal Temporal   SpO2: 99% 97%     Weight:       Height:           SVE: 6/100/-1    FHT: 135/moderate variability/+accels/rare variable decel  toco: ctx Q2 minutes      A/P: 24 y.o.  @ 39w6d by 10 week U/S admitted for labor  - labor: now active. Desires epidural; anesthesiologist aware but currently in OR.   - FHT: cat 2 for rare variable decel, overall reassuring  - GBS: neg  -  Rh +, RI    Madhuri Jackson M.D.

## 2024-11-25 NOTE — ANESTHESIA PROCEDURE NOTES
Epidural Block    Date/Time: 11/25/2024 1:54 PM    Performed by: Juan Luis Lamas D.O.  Authorized by: Juan Luis Lamas D.O.    Patient Location:  OB  Start Time:  11/25/2024 1:54 PM  End Time:  11/25/2024 1:58 PM  Reason for Block: labor analgesia    patient identified, IV checked, site marked, risks and benefits discussed, surgical consent, monitors and equipment checked and pre-op evaluation    Patient Position:  Sitting  Prep: ChloraPrep, patient draped and sterile technique    Monitoring:  Blood pressure, continuous pulse oximetry and heart rate  Approach:  Midline  Location:  L3-L4  Injection Technique:  MELIA air  Skin infiltration:  Lidocaine  Strength:  1%  Dose:  3ml  Needle Type:  Tuohy  Needle Gauge:  17 G  Needle Length:  3.5 in  Loss of resistance::  4  Catheter Size:  19 G  Catheter at Skin Depth:  8  Test Dose Result:  Negative

## 2024-11-25 NOTE — ANESTHESIA PREPROCEDURE EVALUATION
Date: 11/25/24  Procedure: Labor Epidural       G1 @ 39w6d, IUP, Cook  Relevant Problems   No relevant active problems       Physical Exam    Airway   Mallampati: II  TM distance: >3 FB  Neck ROM: full       Cardiovascular - normal exam  Rhythm: regular  Rate: normal  (-) murmur     Dental - normal exam           Pulmonary - normal exam  Breath sounds clear to auscultation     Abdominal    Neurological - normal exam                   Anesthesia Plan    ASA 2       Plan - epidural   Neuraxial block will be labor analgesia                  Pertinent diagnostic labs and testing reviewed    Informed Consent:    Anesthetic plan and risks discussed with patient.

## 2024-11-25 NOTE — PROGRESS NOTES
0700- Bedside report received from Ifeoma SALGUERO using  iPad services; augmentation of labor POC discussed, care assumed. Pt is sitting comfortably in bed breathing through her contractions at this time, pain management options discussed in detail and all questions answered. Call light within reach and encouraged to call with any needs and knows to call with any changes in her sensations.     1900- Bedside report given to Pricila SALGUERO., delivery POC discussed, care relinquished with pt in stable condition actively pushing.

## 2024-11-25 NOTE — PROGRESS NOTES
0326  at 39+6 presents to L+D w/ c/o uc's. Denies LOF or vaginal bleeding, endorses + FM. Term ob check protocols initiated.     0336 report called to Ramos SOTO, orders received to re-check SVE in one hour.     0550 Ramos SOTO on unit, admission orders received     0602 bedside report given to Ifeoma SALGUERO, pt transferred to S2 via wheelchair

## 2024-11-26 LAB
ERYTHROCYTE [DISTWIDTH] IN BLOOD BY AUTOMATED COUNT: 42.1 FL (ref 35.9–50)
HCT VFR BLD AUTO: 30.3 % (ref 37–47)
HGB BLD-MCNC: 9.9 G/DL (ref 12–16)
MCH RBC QN AUTO: 26.8 PG (ref 27–33)
MCHC RBC AUTO-ENTMCNC: 32.7 G/DL (ref 32.2–35.5)
MCV RBC AUTO: 81.9 FL (ref 81.4–97.8)
PLATELET # BLD AUTO: 226 K/UL (ref 164–446)
PMV BLD AUTO: 11.4 FL (ref 9–12.9)
RBC # BLD AUTO: 3.7 M/UL (ref 4.2–5.4)
WBC # BLD AUTO: 20 K/UL (ref 4.8–10.8)

## 2024-11-26 PROCEDURE — 770002 HCHG ROOM/CARE - OB PRIVATE (112)

## 2024-11-26 PROCEDURE — 36415 COLL VENOUS BLD VENIPUNCTURE: CPT

## 2024-11-26 PROCEDURE — 700102 HCHG RX REV CODE 250 W/ 637 OVERRIDE(OP): Performed by: STUDENT IN AN ORGANIZED HEALTH CARE EDUCATION/TRAINING PROGRAM

## 2024-11-26 PROCEDURE — A9270 NON-COVERED ITEM OR SERVICE: HCPCS | Performed by: STUDENT IN AN ORGANIZED HEALTH CARE EDUCATION/TRAINING PROGRAM

## 2024-11-26 PROCEDURE — 302449 STATCHG TRIAGE ONLY (STATISTIC)

## 2024-11-26 PROCEDURE — 85027 COMPLETE CBC AUTOMATED: CPT

## 2024-11-26 RX ADMIN — ACETAMINOPHEN 1000 MG: 500 TABLET ORAL at 22:26

## 2024-11-26 RX ADMIN — ACETAMINOPHEN 1000 MG: 500 TABLET ORAL at 02:34

## 2024-11-26 RX ADMIN — ACETAMINOPHEN 1000 MG: 500 TABLET ORAL at 15:16

## 2024-11-26 RX ADMIN — ACETAMINOPHEN 1000 MG: 500 TABLET ORAL at 07:55

## 2024-11-26 ASSESSMENT — EDINBURGH POSTNATAL DEPRESSION SCALE (EPDS)
I HAVE BEEN ABLE TO LAUGH AND SEE THE FUNNY SIDE OF THINGS: AS MUCH AS I ALWAYS COULD
I HAVE BEEN ANXIOUS OR WORRIED FOR NO GOOD REASON: HARDLY EVER
I HAVE LOOKED FORWARD WITH ENJOYMENT TO THINGS: AS MUCH AS I EVER DID
THE THOUGHT OF HARMING MYSELF HAS OCCURRED TO ME: NEVER
I HAVE FELT SCARED OR PANICKY FOR NO GOOD REASON: NO, NOT AT ALL
I HAVE FELT SAD OR MISERABLE: NO, NOT AT ALL
I HAVE BEEN SO UNHAPPY THAT I HAVE BEEN CRYING: NO, NEVER
THINGS HAVE BEEN GETTING ON TOP OF ME: NO, MOST OF THE TIME I HAVE COPED QUITE WELL
I HAVE BLAMED MYSELF UNNECESSARILY WHEN THINGS WENT WRONG: NO, NEVER
I HAVE BEEN SO UNHAPPY THAT I HAVE HAD DIFFICULTY SLEEPING: NOT AT ALL

## 2024-11-26 ASSESSMENT — PAIN DESCRIPTION - PAIN TYPE
TYPE: ACUTE PAIN

## 2024-11-26 NOTE — ANESTHESIA TIME REPORT
Anesthesia Start and Stop Event Times       Date Time Event    11/25/2024 1311 Ready for Procedure     1350 Anesthesia Start     2026 Anesthesia Stop          Responsible Staff  11/25/24      Name Role Begin End    Juan Luis Lamas D.O. Anesth 1350 2026          Overtime Reason:  no overtime (within assigned shift)    Comments:

## 2024-11-26 NOTE — CARE PLAN
The patient is Stable - Low risk of patient condition declining or worsening    Shift Goals  Clinical Goals: complete cervical dilation and fetal descent;   Patient Goals: pain management as needed; safe delivery for mom and baby; support and education  Family Goals: support and education    Progress made toward(s) clinical / shift goals:    Problem: Risk for Injury  Goal: Patient and fetus will be free of preventable injury/complications  Outcome: Met  Note: Continuous monitoring of uterine activity and fetal well being; intrauterine interventions implemented as needed.      Problem: Pain  Goal: Patient's pain will be alleviated or reduced to the patient’s comfort goal  Outcome: Met  Note: Pain assessed hourly; laboring pain managed with working epidural.       Patient is not progressing towards the following goals: n/a

## 2024-11-26 NOTE — PROGRESS NOTES
Obstetrics & Gynecology Post-Delivery Progress Note    Date of Service  2024    24 y.o.  1 s/p Vaginal, Vacuum (Extractor)  Delivery date: 24  Breastfeeding: Yes    Events  No events    Subjective  Pain: Yes,  location abdomen, perineum and controlled  Bleeding: lochia minimal  PO's: taking regular diet  Voiding: without difficulty  Ambulating: yes  Feeding:  breast and formula feeding     Objective  Temp:  [36 °C (96.8 °F)-37.2 °C (98.9 °F)] 36.2 °C (97.2 °F)  Pulse:  [] 78  Resp:  [17-18] 17  BP: ()/(55-78) 107/72  SpO2:  [94 %-100 %] 100 %    Physical Exam  General: well  Chest/Breasts: nipples intact and breasts soft  Fundus: firm, below umbilicus, and nontender  Incision: not applicable, (vaginal delivery)  Perineum: well healing  Extremities: symmetric and no edema    Lab Results   Component Value Date    RBC 4.27 2024    ABOGROUP O 2024    RH POS 2024     Immunization History   Administered Date(s) Administered    Influenza split virus trivalent (PF) 10/04/2024    RSV ABRYSVO VACCINE 10/24/2024    Tdap Vaccine 10/04/2024       Assessment/Plan  Monserrat Arceo is a 24 y.o.  postpartum day 1 s/p Vaginal, Vacuum (Extractor).    1. Post care: meeting all goals  2. Hemodynamics: awaiting CBC  3. Pain: controlled  4. PNL:   Lab Results   Component Value Date    RH POS 2024    RUBELLAIGG 74.60 2024     5. Method of Feeding: plans to breastfeed  6. Method of Contraception:  not discussed   7. Vaccinations:   Immunization History   Administered Date(s) Administered    Influenza split virus trivalent (PF) 10/04/2024    RSV ABRYSVO VACCINE 10/24/2024    Tdap Vaccine 10/04/2024     8. Disposition: likely home postpartum day 2    No new Assessment & Plan notes have been filed under this hospital service since the last note was generated.  Service: Obstetrics & Gynecology       VTE prophylaxis: ambulatory     Niya Hercules  KEITH

## 2024-11-26 NOTE — ED PROVIDER NOTES
In room to assess progress. Pt's pain is well controlled.    RN chaperone: SVE C/C/+1     mod ruthie, pos accels, no occasional variable decels, CTX q2 min, cat II.     Start pushing  Continue with pit for IOL    Kristen Galaviz DO, FACOG  [facility]

## 2024-11-26 NOTE — CARE PLAN
Problem: Psychosocial - L&D  Goal: Patient's level of anxiety will decrease  Outcome: Progressing  Note: Implement stimuli reduction, calming techniques. Encourage patient/family/care giver participation. Utilize  to explain all procedures and answer all questions.      Problem: Pain  Goal: Patient's pain will be alleviated or reduced to the patient’s comfort goal  Outcome: Progressing  Flowsheets  Taken 11/25/2024 2215 by Pricila Larkin R.N.  Pain Rating Scale (NPRS): 0  Taken 11/25/2024 1900 by Sara Angulo R.N.  OB Pain Level: 1-Coping  OB Pain Intervention: Epidural  Note: Document pain using the appropriate pain scale per order or unit policy. Administer pain medications per provider order. Pain management medications as ordered. Educate and implement non-pharmacologic comfort measures (i.e. relaxation, distraction, massage, cold/heat therapy, etc.). Assess for nonverbal signs of ineffective coping with pain and offer pain medication and/or epidural bolus.      The patient is Stable - Low risk of patient condition declining or worsening    Shift Goals  Clinical Goals: vaginal delivery  Patient Goals: healthy mom, healthy baby  Family Goals: support    Progress made toward(s) clinical / shift goals: Progressing

## 2024-11-26 NOTE — L&D DELIVERY NOTE
VACUUM-ASSISTED VAGINAL DELIVERY NOTE    The patient presented at early labor at at 4cm . Her labor was augmented with pitocin and AROM.  She progressed to complete and pushed for 2 hours however with recurrent prolonged decels, she was counseled and consented for a vacuum-assisted vaginal delivery for non-reassuring fetal heart tracing. She was counseled on the risks to include failure to complete the procedure with the need to proceed to . The risk of 3rd and 4th degree tears, risk of hemorrhage necessitating a blood transfusion and fetal risks of cephalohematoma, intracranial hemorrhage, jaundice and NICU stay.      Fetal position found to be OA presentation. Respiratory team was called. Bladder was drained with approximately 100ml of urine. Kiwi vacuum device was placed on the fetal occiput per  protocol. Anesthesia was confirmed and patient was noted to have an appropriately functioning epidural. It was ensured that no maternal tissue was within the device.     With the next set of maternal pushing efforts descent was noted however there was a pop-off. Through the next several contractions, there were no more pop-off. In between each contraction the suction was released. With the next contraction the fetal head delivered in the OA position. The suction was removed and the body delivered with gentle traction. A nuchal cord was noted.      Live male infant, apgars 8/9, wt (see  record).   Gases were sent. The placenta did follow spontaneously. The uterus was not explored.  Vaginal floor laceration and perineal avulsion from the vaginal floor noted. This was repaired using a 3-0 vicryl with a series of interrupted sutures to reapproximated the intact perineum to the vaginal floor. EBL 250mL.    Kristen Galaviz D.O.  2024

## 2024-11-26 NOTE — PROGRESS NOTES
Received report from Pita RN. Educated pt on today's POC. All questions answered at this time. Call light within reach.

## 2024-11-26 NOTE — CARE PLAN
The patient is Stable - Low risk of patient condition declining or worsening    Shift Goals  Clinical Goals: VSS, light lochia  Patient Goals: pain control, rest  Family Goals: support    Progress made toward(s) clinical / shift goals:    Problem: Pain - Standard  Goal: Alleviation of pain or a reduction in pain to the patient’s comfort goal  Outcome: Progressing     Problem: Knowledge Deficit - Postpartum  Goal: Patient will verbalize and demonstrate understanding of self and infant care  Outcome: Progressing       Patient is not progressing towards the following goals:

## 2024-11-26 NOTE — CARE PLAN
The patient is Stable - Low risk of patient condition declining or worsening    Shift Goals  Clinical Goals: fundus and lochia WNL, pain management, void 6 hours after Knight removal  Patient Goals: pain management, rest  Family Goals: support    Progress made toward(s) clinical / shift goals: Fundus firm, lochia scant. Pt's pain managed with prn pain medications and non-pharmacological methods. Pt voids within 6 hours of Knight removal. Pt resting with support at bedside.    Problem: Pain - Standard  Goal: Alleviation of pain or a reduction in pain to the patient’s comfort goal  Outcome: Progressing     Problem: Psychosocial - Postpartum  Goal: Patient will verbalize and demonstrate effective bonding and parenting behavior  Outcome: Progressing     Problem: Altered Physiologic Condition  Goal: Patient physiologically stable as evidenced by normal lochia, palpable uterine involution and vitals within normal limits  Outcome: Progressing     Patient is not progressing towards the following goals: NA

## 2024-11-26 NOTE — PROGRESS NOTES
Pt arrived to S324 via wheelchair with L&D RN. Report received from JOSE M Mcnluty. Assessment completed. Denies pain at this time. Pt educated regarding requirement to void 6 hours after Knight removal, pt expresses understanding. Pt oriented to room, call light, infant security, emergency light, and unit routine. Encouraged to call for assistance.      2355: RN notifies Dr. Galaviz regarding no postpartum CBC order placed on this pt. Per Dr. Galaviz, no CBC necessary. No new orders at this time.    0100: 100 mL urine output collected in urine collection hat.    0633: RN spoke with CNMoreno YORK, regarding postpartum CBC order. Order placed by Moreno.   no

## 2024-11-26 NOTE — ANESTHESIA POSTPROCEDURE EVALUATION
Patient: Monserrat Arceo    Procedure Summary       Date: 11/25/24 Room / Location:     Anesthesia Start: 1350 Anesthesia Stop: 2026    Procedure: Labor Epidural Diagnosis:     Scheduled Providers:  Responsible Provider: Juan Luis Lamas D.O.    Anesthesia Type: epidural ASA Status: 2            Final Anesthesia Type: epidural  Last vitals  BP   Blood Pressure: 110/73    Temp   36.7 °C (98 °F)    Pulse   (!) 112   Resp   17    SpO2   98 %      Anesthesia Post Evaluation    Patient location during evaluation: floor  Patient participation: complete - patient participated  Level of consciousness: awake and alert  Pain score: 2    Airway patency: patent  Anesthetic complications: no  Cardiovascular status: hemodynamically stable  Respiratory status: acceptable  Hydration status: euvolemic    PONV: none          No notable events documented.     Nurse Pain Score: 3 (NPRS)

## 2024-11-26 NOTE — PROGRESS NOTES
1900- Received report from Alyssa SALGUERO. Pt currently pushing. Reviewed delivery plan with pt, verbalizes understanding and agreement, no questions at this time.     1915- Moreno CNM to the bedside to push with and evaluate pt.     2026- Delivery of viable male infant, 8/9 APGARs. Received orders from Dr. Galaviz to insert Knight while pt remains on L&D d/t to significant vaginal swelling. Dr. Galaviz states to apply ice packs and remove Knight prior to transfer to postpartum.      2317- Pt transferred to postpartum unit via wheelchair with male infant in arms, FOB following with all belongings. Report given to Pita SALGUERO, care relinquished at this time.

## 2024-11-26 NOTE — LACTATION NOTE
"This note was copied from a baby's chart.  MOB English speaking used Vanessa as . MOB chooses to provide mixed feeds, primarily formula bottle. Baby 39.6 weeks, , vacuum popoff x 1, VSD- baby Echo. LC provided demo on hand expression, educated mother on \"supply & demand\". LC attempted to latch baby, baby sleepy, no latch- see latch assessment score. Baby tongue sucking, educated mother on slow pace bottle feeding. Encouraged STS, baby placed in safe STS position.     Educated mother to attempt to breastfeed first, after breastfeeding may offer formula according to Supplemental Guidelines. Supplemental Guidelines given with review.     MOB has Medicaid & an appointment with WIC for eligibility.       "

## 2024-11-27 ENCOUNTER — PHARMACY VISIT (OUTPATIENT)
Dept: PHARMACY | Facility: MEDICAL CENTER | Age: 24
End: 2024-11-27
Payer: COMMERCIAL

## 2024-11-27 VITALS
TEMPERATURE: 98.7 F | OXYGEN SATURATION: 97 % | HEIGHT: 60 IN | SYSTOLIC BLOOD PRESSURE: 122 MMHG | WEIGHT: 133 LBS | RESPIRATION RATE: 17 BRPM | DIASTOLIC BLOOD PRESSURE: 69 MMHG | BODY MASS INDEX: 26.11 KG/M2 | HEART RATE: 98 BPM

## 2024-11-27 PROCEDURE — A9270 NON-COVERED ITEM OR SERVICE: HCPCS | Performed by: STUDENT IN AN ORGANIZED HEALTH CARE EDUCATION/TRAINING PROGRAM

## 2024-11-27 PROCEDURE — RXMED WILLOW AMBULATORY MEDICATION CHARGE: Performed by: NURSE PRACTITIONER

## 2024-11-27 PROCEDURE — 700102 HCHG RX REV CODE 250 W/ 637 OVERRIDE(OP): Performed by: STUDENT IN AN ORGANIZED HEALTH CARE EDUCATION/TRAINING PROGRAM

## 2024-11-27 RX ORDER — PSEUDOEPHEDRINE HCL 30 MG
100 TABLET ORAL 2 TIMES DAILY PRN
Qty: 60 CAPSULE | Refills: 1 | Status: SHIPPED | OUTPATIENT
Start: 2024-11-27

## 2024-11-27 RX ORDER — FERROUS SULFATE 325(65) MG
325 TABLET, DELAYED RELEASE (ENTERIC COATED) ORAL
Qty: 60 TABLET | Refills: 1 | Status: SHIPPED | OUTPATIENT
Start: 2024-11-27

## 2024-11-27 RX ORDER — IBUPROFEN 800 MG/1
800 TABLET, FILM COATED ORAL EVERY 8 HOURS PRN
Qty: 60 TABLET | Refills: 1 | Status: SHIPPED | OUTPATIENT
Start: 2024-11-27

## 2024-11-27 RX ORDER — ACETAMINOPHEN 500 MG
1000 TABLET ORAL EVERY 6 HOURS PRN
Qty: 60 TABLET | Refills: 1 | Status: SHIPPED | OUTPATIENT
Start: 2024-11-27

## 2024-11-27 RX ADMIN — IBUPROFEN 800 MG: 800 TABLET, FILM COATED ORAL at 09:22

## 2024-11-27 ASSESSMENT — PAIN DESCRIPTION - PAIN TYPE
TYPE: ACUTE PAIN
TYPE: ACUTE PAIN

## 2024-11-27 NOTE — DISCHARGE SUMMARY
Discharge Summary:      Monserrat Arceo    Admit Date:   2024  Discharge Date:  2024     Admitting diagnosis:  Labor and delivery indication for care or intervention [O75.9]  Discharge Diagnosis: Status post vaginal, spontaneous.  Pregnancy Complications: EIF, small VSD, no genetic testing   Tubal Ligation:  no        History:  History reviewed. No pertinent past medical history.  OB History    Para Term  AB Living   1 1 1     1   SAB IAB Ectopic Molar Multiple Live Births           0 1      # Outcome Date GA Lbr Zana/2nd Weight Sex Type Anes PTL Lv   1 Term 24 39w6d / 02:51 3.28 kg (7 lb 3.7 oz) M Vag-Vacuum EPI N DUNCAN      Complications: Fetal Intolerance        Patient has no known allergies.  Patient Active Problem List    Diagnosis Date Noted    Labor and delivery indication for care or intervention 2024    Anemia during pregnancy in third trimester 2024    Abnormal fetal ultrasound 07/10/2024    Encounter for supervision of normal first pregnancy in third trimester 2024        Hospital Course:   24 y.o. , now para 1, was admitted with the above mentioned diagnosis, underwent Active Labor, vaginal, spontaneous. Patient postpartum course was unremarkable, with progressive advancement in diet , ambulation and toleration of oral analgesia. Patient without complaints today and desires discharge.      Vitals:    24 0140 24 0554 24 0755 24 0535   BP: 103/68 107/72 96/59 122/69   Pulse: 96 78 79 98   Resp: 17 17 17 17   Temp: 37.2 °C (98.9 °F) 36.2 °C (97.2 °F) 36.8 °C (98.2 °F) 37.1 °C (98.7 °F)   TempSrc: Temporal Temporal Temporal Temporal   SpO2: 99% 100% 100% 97%   Weight:       Height:           Current Facility-Administered Medications   Medication Dose    oxytocin (Pitocin) infusion (for post delivery)  125 mL/hr    LR infusion      oxytocin (Pitocin) 0.02 Units/mL  0-20 gill-units/min    ropivacaine 0.2 %  (Naropin) injection      docusate sodium (Colace) capsule 100 mg  100 mg    ibuprofen (Motrin) tablet 800 mg  800 mg    acetaminophen (Tylenol) tablet 1,000 mg  1,000 mg       Exam:  Breast Exam: negative  Abdomen: Abdomen soft, non-tender. BS normal. No masses,  No organomegaly  Fundus Non Tender: yes  Incision: none  Perineum: perineum repaired  Extremity: extremities, peripheral pulses and reflexes normal, no edema, redness or tenderness in the calves or thighs     Labs:  Recent Labs     11/25/24  0630 11/26/24  0654   WBC 10.0 20.0*   RBC 4.27 3.70*   HEMOGLOBIN 11.2* 9.9*   HEMATOCRIT 34.9* 30.3*   MCV 81.7 81.9   MCH 26.2* 26.8*   MCHC 32.1* 32.7   RDW 41.1 42.1   PLATELETCT 248 226   MPV 11.9 11.4        Activity:   Discharge to home  Pelvic Rest x 6 weeks    Assessment:  normal postpartum course  Discharge Assessment: No areas of skin breakdown/redness; surgical incision intact/healing     [facility]: [facility] in 5 weeks for vaginal   Declines birth control until PP.      Discharge Meds:   Current Outpatient Medications   Medication Sig Dispense Refill    acetaminophen (TYLENOL) 500 MG Tab Take 2 Tablets by mouth every 6 hours as needed for Mild Pain, Moderate Pain or Fever. 60 Tablet 1    docusate sodium 100 MG Cap Take 100 mg by mouth 2 times a day as needed for Constipation. 60 Capsule 1    ibuprofen (MOTRIN) 800 MG Tab Take 1 Tablet by mouth every 8 hours as needed for Mild Pain, Moderate Pain, Headache or Inflammation. 60 Tablet 1    ferrous sulfate 325 (65 Fe) MG EC tablet Take 1 Tablet by mouth every day with lunch. 60 Tablet 1       KEITH Watkins

## 2024-11-27 NOTE — DISCHARGE INSTRUCTIONS

## 2024-11-27 NOTE — PROGRESS NOTES
Assessment complete, pt resting comfortably in bed at this time. Fundus firm, lochia scant. Pain controlled with prn medications per MAR. Pt states voiding without difficulty. POC discussed. Call light in reach of pt, encouraged to call for assistance.      0323: Report given to JOSE M Nolasco. Care relinquished at this time.

## 2025-06-15 ENCOUNTER — APPOINTMENT (OUTPATIENT)
Dept: RADIOLOGY | Facility: MEDICAL CENTER | Age: 25
End: 2025-06-15
Attending: STUDENT IN AN ORGANIZED HEALTH CARE EDUCATION/TRAINING PROGRAM

## 2025-06-15 ENCOUNTER — HOSPITAL ENCOUNTER (EMERGENCY)
Facility: MEDICAL CENTER | Age: 25
End: 2025-06-16
Attending: STUDENT IN AN ORGANIZED HEALTH CARE EDUCATION/TRAINING PROGRAM

## 2025-06-15 DIAGNOSIS — O03.9 MISCARRIAGE: Primary | ICD-10-CM

## 2025-06-15 LAB
ABO GROUP BLD: NORMAL
ALBUMIN SERPL BCP-MCNC: 4.7 G/DL (ref 3.2–4.9)
ALBUMIN/GLOB SERPL: 1.5 G/DL
ALP SERPL-CCNC: 80 U/L (ref 30–99)
ALT SERPL-CCNC: 9 U/L (ref 2–50)
ANION GAP SERPL CALC-SCNC: 14 MMOL/L (ref 7–16)
AST SERPL-CCNC: 25 U/L (ref 12–45)
B-HCG SERPL-ACNC: ABNORMAL MIU/ML (ref 0–5)
BASOPHILS # BLD AUTO: 0.7 % (ref 0–1.8)
BASOPHILS # BLD: 0.06 K/UL (ref 0–0.12)
BILIRUB SERPL-MCNC: 0.3 MG/DL (ref 0.1–1.5)
BLD GP AB SCN SERPL QL: NORMAL
BUN SERPL-MCNC: 7 MG/DL (ref 8–22)
CALCIUM ALBUM COR SERPL-MCNC: 8.6 MG/DL (ref 8.5–10.5)
CALCIUM SERPL-MCNC: 9.2 MG/DL (ref 8.5–10.5)
CHLORIDE SERPL-SCNC: 104 MMOL/L (ref 96–112)
CO2 SERPL-SCNC: 20 MMOL/L (ref 20–33)
CREAT SERPL-MCNC: 0.54 MG/DL (ref 0.5–1.4)
EOSINOPHIL # BLD AUTO: 0.22 K/UL (ref 0–0.51)
EOSINOPHIL NFR BLD: 2.7 % (ref 0–6.9)
ERYTHROCYTE [DISTWIDTH] IN BLOOD BY AUTOMATED COUNT: 43 FL (ref 35.9–50)
GFR SERPLBLD CREATININE-BSD FMLA CKD-EPI: 129 ML/MIN/1.73 M 2
GLOBULIN SER CALC-MCNC: 3.2 G/DL (ref 1.9–3.5)
GLUCOSE SERPL-MCNC: 96 MG/DL (ref 65–99)
HCT VFR BLD AUTO: 36.6 % (ref 37–47)
HGB BLD-MCNC: 12.5 G/DL (ref 12–16)
IMM GRANULOCYTES # BLD AUTO: 0.02 K/UL (ref 0–0.11)
IMM GRANULOCYTES NFR BLD AUTO: 0.2 % (ref 0–0.9)
LIPASE SERPL-CCNC: 24 U/L (ref 11–82)
LYMPHOCYTES # BLD AUTO: 2.82 K/UL (ref 1–4.8)
LYMPHOCYTES NFR BLD: 34.7 % (ref 22–41)
MCH RBC QN AUTO: 27.7 PG (ref 27–33)
MCHC RBC AUTO-ENTMCNC: 34.2 G/DL (ref 32.2–35.5)
MCV RBC AUTO: 81 FL (ref 81.4–97.8)
MONOCYTES # BLD AUTO: 0.55 K/UL (ref 0–0.85)
MONOCYTES NFR BLD AUTO: 6.8 % (ref 0–13.4)
NEUTROPHILS # BLD AUTO: 4.45 K/UL (ref 1.82–7.42)
NEUTROPHILS NFR BLD: 54.9 % (ref 44–72)
NRBC # BLD AUTO: 0 K/UL
NRBC BLD-RTO: 0 /100 WBC (ref 0–0.2)
NUMBER OF RH DOSES IND 8505RD: NORMAL
PLATELET # BLD AUTO: 250 K/UL (ref 164–446)
PMV BLD AUTO: 10.3 FL (ref 9–12.9)
POTASSIUM SERPL-SCNC: 3.2 MMOL/L (ref 3.6–5.5)
PROT SERPL-MCNC: 7.9 G/DL (ref 6–8.2)
RBC # BLD AUTO: 4.52 M/UL (ref 4.2–5.4)
RH BLD: NORMAL
RH BLD: NORMAL
SODIUM SERPL-SCNC: 138 MMOL/L (ref 135–145)
WBC # BLD AUTO: 8.1 K/UL (ref 4.8–10.8)

## 2025-06-15 PROCEDURE — 96375 TX/PRO/DX INJ NEW DRUG ADDON: CPT

## 2025-06-15 PROCEDURE — 76817 TRANSVAGINAL US OBSTETRIC: CPT

## 2025-06-15 PROCEDURE — 80053 COMPREHEN METABOLIC PANEL: CPT

## 2025-06-15 PROCEDURE — 99285 EMERGENCY DEPT VISIT HI MDM: CPT

## 2025-06-15 PROCEDURE — 88305 TISSUE EXAM BY PATHOLOGIST: CPT | Mod: 26 | Performed by: PATHOLOGY

## 2025-06-15 PROCEDURE — 86901 BLOOD TYPING SEROLOGIC RH(D): CPT

## 2025-06-15 PROCEDURE — 36415 COLL VENOUS BLD VENIPUNCTURE: CPT

## 2025-06-15 PROCEDURE — 700111 HCHG RX REV CODE 636 W/ 250 OVERRIDE (IP): Performed by: STUDENT IN AN ORGANIZED HEALTH CARE EDUCATION/TRAINING PROGRAM

## 2025-06-15 PROCEDURE — 86900 BLOOD TYPING SEROLOGIC ABO: CPT

## 2025-06-15 PROCEDURE — 85025 COMPLETE CBC W/AUTO DIFF WBC: CPT

## 2025-06-15 PROCEDURE — 84702 CHORIONIC GONADOTROPIN TEST: CPT

## 2025-06-15 PROCEDURE — 88305 TISSUE EXAM BY PATHOLOGIST: CPT | Performed by: PATHOLOGY

## 2025-06-15 PROCEDURE — 96374 THER/PROPH/DIAG INJ IV PUSH: CPT

## 2025-06-15 PROCEDURE — 86850 RBC ANTIBODY SCREEN: CPT

## 2025-06-15 PROCEDURE — 83690 ASSAY OF LIPASE: CPT

## 2025-06-15 RX ORDER — ONDANSETRON 2 MG/ML
4 INJECTION INTRAMUSCULAR; INTRAVENOUS ONCE
Status: COMPLETED | OUTPATIENT
Start: 2025-06-15 | End: 2025-06-15

## 2025-06-15 RX ORDER — MORPHINE SULFATE 4 MG/ML
6 INJECTION INTRAVENOUS ONCE
Status: COMPLETED | OUTPATIENT
Start: 2025-06-15 | End: 2025-06-15

## 2025-06-15 RX ADMIN — MORPHINE SULFATE 6 MG: 4 INJECTION INTRAVENOUS at 22:23

## 2025-06-15 RX ADMIN — ONDANSETRON 4 MG: 2 INJECTION INTRAMUSCULAR; INTRAVENOUS at 22:23

## 2025-06-16 ENCOUNTER — PHARMACY VISIT (OUTPATIENT)
Dept: PHARMACY | Facility: MEDICAL CENTER | Age: 25
End: 2025-06-16
Payer: COMMERCIAL

## 2025-06-16 VITALS
HEART RATE: 76 BPM | DIASTOLIC BLOOD PRESSURE: 54 MMHG | WEIGHT: 133 LBS | OXYGEN SATURATION: 97 % | SYSTOLIC BLOOD PRESSURE: 92 MMHG | HEIGHT: 60 IN | RESPIRATION RATE: 18 BRPM | BODY MASS INDEX: 26.11 KG/M2 | TEMPERATURE: 97 F

## 2025-06-16 LAB — PATHOLOGY CONSULT NOTE: NORMAL

## 2025-06-16 PROCEDURE — 700111 HCHG RX REV CODE 636 W/ 250 OVERRIDE (IP): Mod: JZ | Performed by: STUDENT IN AN ORGANIZED HEALTH CARE EDUCATION/TRAINING PROGRAM

## 2025-06-16 PROCEDURE — 700105 HCHG RX REV CODE 258: Performed by: STUDENT IN AN ORGANIZED HEALTH CARE EDUCATION/TRAINING PROGRAM

## 2025-06-16 PROCEDURE — 96376 TX/PRO/DX INJ SAME DRUG ADON: CPT

## 2025-06-16 PROCEDURE — 96375 TX/PRO/DX INJ NEW DRUG ADDON: CPT

## 2025-06-16 PROCEDURE — RXMED WILLOW AMBULATORY MEDICATION CHARGE: Performed by: STUDENT IN AN ORGANIZED HEALTH CARE EDUCATION/TRAINING PROGRAM

## 2025-06-16 RX ORDER — MISOPROSTOL 200 UG/1
800 TABLET ORAL ONCE
Qty: 4 TABLET | Refills: 0 | Status: SHIPPED | OUTPATIENT
Start: 2025-06-16 | End: 2025-06-17

## 2025-06-16 RX ORDER — ONDANSETRON 4 MG/1
4 TABLET, ORALLY DISINTEGRATING ORAL EVERY 6 HOURS PRN
Qty: 10 TABLET | Refills: 0 | Status: SHIPPED | OUTPATIENT
Start: 2025-06-16

## 2025-06-16 RX ORDER — SODIUM CHLORIDE 9 MG/ML
1000 INJECTION, SOLUTION INTRAVENOUS ONCE
Status: COMPLETED | OUTPATIENT
Start: 2025-06-16 | End: 2025-06-16

## 2025-06-16 RX ORDER — KETOROLAC TROMETHAMINE 15 MG/ML
15 INJECTION, SOLUTION INTRAMUSCULAR; INTRAVENOUS ONCE
Status: COMPLETED | OUTPATIENT
Start: 2025-06-16 | End: 2025-06-16

## 2025-06-16 RX ORDER — ONDANSETRON 2 MG/ML
4 INJECTION INTRAMUSCULAR; INTRAVENOUS ONCE
Status: COMPLETED | OUTPATIENT
Start: 2025-06-16 | End: 2025-06-16

## 2025-06-16 RX ADMIN — ONDANSETRON 4 MG: 2 INJECTION INTRAMUSCULAR; INTRAVENOUS at 01:28

## 2025-06-16 RX ADMIN — KETOROLAC TROMETHAMINE 15 MG: 15 INJECTION, SOLUTION INTRAMUSCULAR; INTRAVENOUS at 01:28

## 2025-06-16 RX ADMIN — SODIUM CHLORIDE 1000 ML: 9 INJECTION, SOLUTION INTRAVENOUS at 01:28

## 2025-06-16 NOTE — PROGRESS NOTES
This RN received call from pathology about products of conception and unable to have enough for respectful disposition, escalated to Dr. Teague who will communicate this information to the patient at the one week follow up appointment

## 2025-06-16 NOTE — DISCHARGE INSTRUCTIONS
Le hemos recetado medicamentos para las náuseas. También le hemos recetado medicamentos para ayudar a evacuar el tejido restante y controlar el sangrado. Después de elizabeth Vasoprost, puede esperar sangrado vaginal y cólicos tello 2 a 12 horas. Si presenta dolor incontrolable, fiebre, vómitos o sangrado a través de más de 2 o 3 toallas sanitarias por hora tello 2 horas, regrese a urgencias. Le hemos remitido a un ginecólogo con la información de contacto; debe tener devin gerardo de seguimiento dentro de devin semana. Llámelo mañana a primera hora.    We have given you a prescription for nausea medication.  Given your prescription for medicine to help pass the remainder of tissue and control your bleeding.  After you take the Vasoprost all you can expect to have vaginal bleeding and cramping for 2 to 12 hours.  If you have uncontrolled pain, fever, vomiting, bleeding through more than 2-3 pads per hour for 2 hours please return to the emergency room.  We have placed referral given to contact information for a gynecologist you should follow-up within 1 week.  Please call them first thing in the morning tomorrow.

## 2025-06-16 NOTE — ED PROVIDER NOTES
ER Provider Note    Scribed for Dr. Jed Jackson D.O. by Duncan Zarco. 6/15/2025  9:55 PM    Primary Care Provider: Pcp Pt States None    CHIEF COMPLAINT  Chief Complaint   Patient presents with    Vaginal Bleeding     Patient is 14 weeks pregnant and started to have some bleeding about 30 min PTA. Reports that she passed large clots and is having pain on both sides of abdomen. She has not yet had US for this pregancy       EXTERNAL RECORDS REVIEWED  Inpatient Notes Patient last seen here on 11/25/24 for labor and delivery with normal vaginal birth.     HPI/ROS  LIMITATION TO HISTORY   Select: Language Tajik,  Used     OUTSIDE HISTORIAN(S):  Family Member is present.     Monserrat Arceo is a 25 y.o. female who presents to the ED for evaluation of vaginal bleeding onset prior to arrival. She explains having abdominal pain coupled with large blood clots thirty minutes ago, which prompted her to the ED. Patient mentions this is her second pregnancy, and her first pregnancy was a normal vaginal birth. The patient only recently confirmed her pregnancy with an at-home pregnancy test. She takes no prescribed medications. She does not have an established OB-GYN.     PAST MEDICAL HISTORY  Past Medical History[1]    SURGICAL HISTORY  Past Surgical History[2]    FAMILY HISTORY  History reviewed. No pertinent family history.    SOCIAL HISTORY   reports that she has never smoked. She has never used smokeless tobacco. She reports that she does not drink alcohol and does not use drugs.    CURRENT MEDICATIONS  Previous Medications    ACETAMINOPHEN (TYLENOL) 500 MG TAB    Take 2 Tablets by mouth every 6 hours as needed for Mild Pain, Moderate Pain or Fever.    DOCUSATE SODIUM 100 MG CAP    Take 1 capsule by mouth 2 times a day as needed for Constipation.    FERROUS SULFATE 325 (65 FE) MG EC TABLET    Take 1 Tablet by mouth every day with lunch.    IBUPROFEN (MOTRIN) 800 MG TAB    Take 1  Tablet by mouth every 8 hours as needed for Mild Pain, Moderate Pain, Headache or Inflammation.    PRENATAL MV-MIN-FE FUM-FA-DHA (PRENATAL 1 PO)    Take  by mouth.       ALLERGIES  Patient has no known allergies.    PHYSICAL EXAM  /61   Pulse 85   Temp 36.2 °C (97.1 °F) (Temporal)   Resp 20   Ht 1.524 m (5')   Wt 60.3 kg (133 lb)   LMP 04/30/2025 (Approximate)   SpO2 97%   BMI 25.97 kg/m²   Constitutional: Alert in no apparent distress.  HENT: No signs of trauma, Bilateral external ears normal, Nose normal.   Eyes: Pupils are equal and reactive, Conjunctiva normal, Non-icteric.   Neck: Normal range of motion, No tenderness, Supple, No stridor.   Cardiovascular: Regular rate and rhythm, no murmurs.   Thorax & Lungs: Normal breath sounds, No respiratory distress, No wheezing, No chest tenderness.   Abdomen: Bowel sounds normal, Soft, No tenderness, No masses, No pulsatile masses.   Skin: Warm, Dry, No erythema, No rash.   Back: No bony tenderness, No CVA tenderness.   : Oozing and bleeding with open cervical os, Fetal tissue present, Large clots removed with forceps and suction.   Extremities: Intact distal pulses, No edema, No tenderness, No cyanosis  Musculoskeletal: Good range of motion in all major joints. No tenderness to palpation or major deformities noted.   Neurologic: Alert , Normal motor function, Normal sensory function, No focal deficits noted.     DIAGNOSTIC STUDIES & PROCEDURES    Labs:   Results for orders placed or performed during the hospital encounter of 06/15/25   CBC WITH DIFFERENTIAL    Collection Time: 06/15/25  9:14 PM   Result Value Ref Range    WBC 8.1 4.8 - 10.8 K/uL    RBC 4.52 4.20 - 5.40 M/uL    Hemoglobin 12.5 12.0 - 16.0 g/dL    Hematocrit 36.6 (L) 37.0 - 47.0 %    MCV 81.0 (L) 81.4 - 97.8 fL    MCH 27.7 27.0 - 33.0 pg    MCHC 34.2 32.2 - 35.5 g/dL    RDW 43.0 35.9 - 50.0 fL    Platelet Count 250 164 - 446 K/uL    MPV 10.3 9.0 - 12.9 fL    Neutrophils-Polys 54.90 44.00  - 72.00 %    Lymphocytes 34.70 22.00 - 41.00 %    Monocytes 6.80 0.00 - 13.40 %    Eosinophils 2.70 0.00 - 6.90 %    Basophils 0.70 0.00 - 1.80 %    Immature Granulocytes 0.20 0.00 - 0.90 %    Nucleated RBC 0.00 0.00 - 0.20 /100 WBC    Neutrophils (Absolute) 4.45 1.82 - 7.42 K/uL    Lymphs (Absolute) 2.82 1.00 - 4.80 K/uL    Monos (Absolute) 0.55 0.00 - 0.85 K/uL    Eos (Absolute) 0.22 0.00 - 0.51 K/uL    Baso (Absolute) 0.06 0.00 - 0.12 K/uL    Immature Granulocytes (abs) 0.02 0.00 - 0.11 K/uL    NRBC (Absolute) 0.00 K/uL   COMP METABOLIC PANEL    Collection Time: 06/15/25  9:14 PM   Result Value Ref Range    Sodium 138 135 - 145 mmol/L    Potassium 3.2 (L) 3.6 - 5.5 mmol/L    Chloride 104 96 - 112 mmol/L    Co2 20 20 - 33 mmol/L    Anion Gap 14.0 7.0 - 16.0    Glucose 96 65 - 99 mg/dL    Bun 7 (L) 8 - 22 mg/dL    Creatinine 0.54 0.50 - 1.40 mg/dL    Calcium 9.2 8.5 - 10.5 mg/dL    Correct Calcium 8.6 8.5 - 10.5 mg/dL    AST(SGOT) 25 12 - 45 U/L    ALT(SGPT) 9 2 - 50 U/L    Alkaline Phosphatase 80 30 - 99 U/L    Total Bilirubin 0.3 0.1 - 1.5 mg/dL    Albumin 4.7 3.2 - 4.9 g/dL    Total Protein 7.9 6.0 - 8.2 g/dL    Globulin 3.2 1.9 - 3.5 g/dL    A-G Ratio 1.5 g/dL   LIPASE    Collection Time: 06/15/25  9:14 PM   Result Value Ref Range    Lipase 24 11 - 82 U/L   HCG QUANTITATIVE    Collection Time: 06/15/25  9:14 PM   Result Value Ref Range    Bhcg 50840.0 (H) 0.0 - 5.0 mIU/mL   RH Type for Rhogam from E.D.    Collection Time: 06/15/25  9:14 PM   Result Value Ref Range    Emergency Department Rh Typing POS     Number Of Rh Doses Indicated ZERO    ESTIMATED GFR    Collection Time: 06/15/25  9:14 PM   Result Value Ref Range    GFR (CKD-EPI) 129 >60 mL/min/1.73 m 2   COD - Adult (Type and Screen)    Collection Time: 06/15/25  9:14 PM   Result Value Ref Range    ABO Grouping Only O     Rh Grouping Only POS     Antibody Screen-Cod NEG       All labs reviewed by me.    Radiology:   The attending Emergency Physician has  independently interpreted the diagnostic imaging associated with this visit and is awaiting the final reading from the radiologist, which will be displayed below.    Preliminary interpretation is a follows: No intrauterine pregnancy  Radiologist interpretation:    US-OB 1ST TRIMESTER WITH TRANSVAGINAL (COMBO)   Final Result         1.  No definite intrauterine pregnancy identified, could represent early gestation or missed spontaneous , occult ectopic appearance is not sonographically excluded.   2.  Anechoic cystic structure in the lower uterine segment, could represent fluid, small gestational sac possibly spontaneous  in progress, or possibly nabothian cysts.   3.  Thickened endometrial stripe, likely proliferative changes given patient age.           COURSE & MEDICAL DECISION MAKING    Hydration: Based on the patient's presentation of Dehydration the patient was given IV fluids. IV Hydration was used because oral hydration was not adequate alone. Upon recheck following hydration, the patient was improved.     INITIAL ASSESSMENT AND PLAN  Care Narrative:       9:55 PM - Patient seen and evaluated at bedside. I performed the pelvic exam with female chaperone at bedside.  Patient noted to have open cervical os with fetal tissue.  Fetal tissue was removed with sterile forceps and suction.  Patient does not have any abdominal tenderness overall lower suspicion for ectopic pregnancy suspect .  Will monitor patient's amount of bleeding, repeat pelvic exam and obtain ultrasound.    Ultrasound with no signs of intrauterine pregnancy, anechoic cystic structure in the lower uterine segment possibly small gestational sac.  Blood work notable for mild hypokalemia otherwise unremarkable.  Patient is Rh+.  Repeat pelvic exam was performed with female chaperone patient had oozing bleeding but no impacted fetal tissue.  Patient is with through 2 pads in 4 hours.  Patient did have passage of large blood  clot with likely fetal tissue.  Fetal tissue was sent for histology.  Patient given IV fluids, antiemetics and analgesics after which she had resolution of abdominal cramping and nausea.  Patient's been able to tolerate p.o.  Patient has resolved tachycardia.  Spoke with gynecology who recommended medical management with misoprostol, 1 week follow-up in their office and return precautions.  Discussed with patient plan for medical management with misoprostol at home, 1 week gynecology follow-up and strict return precautions which she is comfortable with.                     DISPOSITION AND DISCUSSIONS  I have discussed management of the patient with the following physicians and ADIN's: Gynecology    Barriers to care at this time, including but not limited to: Patient does not have established PCP.     Decision tools and prescription drugs considered including, but not limited to: Cytotec.        FINAL IMPRESSION   1. Miscarriage         Duncan NAYLOR (Bradibe), am scribing for, and in the presence of, Jed Jackson D.O.    Electronically signed by: Duncan Zarco (Prudencio), 6/15/2025    Jed NAYLOR D.O. personally performed the services described in this documentation, as scribed by Duncan Zarco in my presence, and it is both accurate and complete.    The note accurately reflects work and decisions made by me.  Jed Jackson D.O.  6/16/2025  3:49 AM         [1] History reviewed. No pertinent past medical history.  [2] History reviewed. No pertinent surgical history.

## 2025-06-16 NOTE — ED NOTES
Pt provided meds to bed. PIV d/c. Pt provided d/c instructions and verbalizes understanding with no further questions. Home care instructions explained. Pt ambulatory to ED kristin

## 2025-06-16 NOTE — ED NOTES
Pt to [facility] from lobby via w/c with family member.  On monitor, call light in reach. Chart up for ERP.    Pt has large amount of vaginal bleeding occurring on arrival to room.

## 2025-06-16 NOTE — ED NOTES
Pt prepped for pelvic exam, blood clot + meaty material found on pt chucks. ERP made aware.    Language line facilitated.

## 2025-06-16 NOTE — ED NOTES
Pt requesting respectful disposition, SW contacted, specimen walked to lab with all correct labels in place.

## 2025-06-16 NOTE — ED NOTES
Report from Myrna SALGUERO. Pt appears to be resting in lary, RR even and unlabored, NAD. Pt has no additional requests at this time

## 2025-06-16 NOTE — CONSULTS
Brief consult note    Patient is 25-year-old female -0-0-1 at 14 weeks estimated gestational age by LMP without ultrasound confirmation who presented to the emergency department with vaginal bleeding.  She was evaluated by the ED provider who noted fetal tissue at the cervical os, which was extracted.  After that cramping and bleeding subsided.  Pelvic ultrasound which was performed prior to her pelvic exam was notable for absence of intrauterine pregnancy, thickened endometrial stripe, cystic structure in the lower uterine segment likely gestational sac in the setting of spontaneous  in progress.  She had normal appearing adnexa.    Vital signs within normal limits.  Hemoglobin 12, Rh+, serum hCG > 100,000.    As the fetal tissue had already been extracted, cramping and bleeding subsided, I recommended course of misoprostol 800 mcg buccally to complete .    I have also requested follow-up in clinic with me in 1 week.    Mari Teague MD     pedro luis

## 2025-06-19 LAB — COMPONENT CELLULAR 8504CLL: NORMAL
